# Patient Record
Sex: FEMALE | Race: WHITE | NOT HISPANIC OR LATINO | Employment: FULL TIME | URBAN - NONMETROPOLITAN AREA
[De-identification: names, ages, dates, MRNs, and addresses within clinical notes are randomized per-mention and may not be internally consistent; named-entity substitution may affect disease eponyms.]

---

## 2017-01-04 ENCOUNTER — HOSPITAL ENCOUNTER (OUTPATIENT)
Dept: MAMMOGRAPHY | Facility: HOSPITAL | Age: 67
Discharge: HOME | End: 2017-01-04
Attending: OBSTETRICS & GYNECOLOGY | Admitting: OBSTETRICS & GYNECOLOGY

## 2017-01-04 DIAGNOSIS — Z12.39 ENCOUNTER FOR SCREENING FOR MALIGNANT NEOPLASM OF BREAST: ICD-10-CM

## 2017-01-04 PROCEDURE — 77063 BREAST TOMOSYNTHESIS BI: CPT

## 2017-01-04 PROCEDURE — G0202 SCR MAMMO BI INCL CAD: HCPCS

## 2017-01-04 PROCEDURE — G0123 SCREEN CERV/VAG THIN LAYER: HCPCS | Performed by: OBSTETRICS & GYNECOLOGY

## 2017-01-05 ENCOUNTER — LAB REQUISITION (OUTPATIENT)
Dept: LAB | Facility: HOSPITAL | Age: 67
End: 2017-01-05

## 2017-01-05 DIAGNOSIS — Z12.72 ENCOUNTER FOR SCREENING FOR MALIGNANT NEOPLASM OF VAGINA: ICD-10-CM

## 2017-01-05 LAB
GEN CATEG CVX/VAG CYTO-IMP: NORMAL
LAB AP CASE REPORT: NORMAL
LAB AP GYN ADDITIONAL INFORMATION: NORMAL
Lab: NORMAL
PATH INTERP SPEC-IMP: NORMAL
STAT OF ADQ CVX/VAG CYTO-IMP: NORMAL

## 2017-12-21 ENCOUNTER — TRANSCRIBE ORDERS (OUTPATIENT)
Dept: ADMINISTRATIVE | Facility: HOSPITAL | Age: 67
End: 2017-12-21

## 2017-12-21 DIAGNOSIS — Z12.31 ENCOUNTER FOR SCREENING MAMMOGRAM FOR MALIGNANT NEOPLASM OF BREAST: Primary | ICD-10-CM

## 2018-01-08 ENCOUNTER — HOSPITAL ENCOUNTER (OUTPATIENT)
Dept: MAMMOGRAPHY | Facility: HOSPITAL | Age: 68
Discharge: HOME | End: 2018-01-08
Attending: OBSTETRICS & GYNECOLOGY | Admitting: OBSTETRICS & GYNECOLOGY

## 2018-01-08 DIAGNOSIS — Z12.31 ENCOUNTER FOR SCREENING MAMMOGRAM FOR MALIGNANT NEOPLASM OF BREAST: ICD-10-CM

## 2018-01-08 PROCEDURE — 77063 BREAST TOMOSYNTHESIS BI: CPT

## 2018-01-08 PROCEDURE — G0123 SCREEN CERV/VAG THIN LAYER: HCPCS | Performed by: OBSTETRICS & GYNECOLOGY

## 2018-01-08 PROCEDURE — 77067 SCR MAMMO BI INCL CAD: CPT

## 2018-01-09 ENCOUNTER — LAB REQUISITION (OUTPATIENT)
Dept: LAB | Facility: HOSPITAL | Age: 68
End: 2018-01-09

## 2018-01-09 DIAGNOSIS — Z12.72 ENCOUNTER FOR SCREENING FOR MALIGNANT NEOPLASM OF VAGINA: ICD-10-CM

## 2018-01-09 LAB
GEN CATEG CVX/VAG CYTO-IMP: NORMAL
LAB AP CASE REPORT: NORMAL
LAB AP GYN ADDITIONAL INFORMATION: NORMAL
LAB AP GYN OTHER FINDINGS: NORMAL
Lab: NORMAL
PATH INTERP SPEC-IMP: NORMAL
STAT OF ADQ CVX/VAG CYTO-IMP: NORMAL

## 2019-01-07 ENCOUNTER — TRANSCRIBE ORDERS (OUTPATIENT)
Dept: ADMINISTRATIVE | Facility: HOSPITAL | Age: 69
End: 2019-01-07

## 2019-01-07 DIAGNOSIS — Z12.39 BREAST SCREENING: Primary | ICD-10-CM

## 2019-02-05 ENCOUNTER — HOSPITAL ENCOUNTER (OUTPATIENT)
Dept: MAMMOGRAPHY | Facility: HOSPITAL | Age: 69
Discharge: HOME | End: 2019-02-05
Attending: OBSTETRICS & GYNECOLOGY | Admitting: OBSTETRICS & GYNECOLOGY

## 2019-02-05 DIAGNOSIS — Z12.39 BREAST SCREENING: ICD-10-CM

## 2019-02-05 PROCEDURE — 77067 SCR MAMMO BI INCL CAD: CPT

## 2019-02-05 PROCEDURE — 77063 BREAST TOMOSYNTHESIS BI: CPT

## 2020-02-11 ENCOUNTER — TRANSCRIBE ORDERS (OUTPATIENT)
Dept: ADMINISTRATIVE | Facility: HOSPITAL | Age: 70
End: 2020-02-11

## 2020-02-11 DIAGNOSIS — Z12.31 ENCOUNTER FOR SCREENING MAMMOGRAM FOR MALIGNANT NEOPLASM OF BREAST: Primary | ICD-10-CM

## 2020-02-20 ENCOUNTER — HOSPITAL ENCOUNTER (OUTPATIENT)
Dept: MAMMOGRAPHY | Facility: HOSPITAL | Age: 70
Discharge: HOME | End: 2020-02-20
Admitting: OBSTETRICS & GYNECOLOGY

## 2020-02-20 ENCOUNTER — APPOINTMENT (OUTPATIENT)
Dept: MAMMOGRAPHY | Facility: HOSPITAL | Age: 70
End: 2020-02-20

## 2020-02-20 DIAGNOSIS — Z12.31 ENCOUNTER FOR SCREENING MAMMOGRAM FOR MALIGNANT NEOPLASM OF BREAST: ICD-10-CM

## 2020-02-20 PROCEDURE — 77067 SCR MAMMO BI INCL CAD: CPT

## 2020-02-20 PROCEDURE — 77063 BREAST TOMOSYNTHESIS BI: CPT

## 2021-07-07 ENCOUNTER — OFFICE VISIT (OUTPATIENT)
Dept: OBGYN CLINIC | Age: 71
End: 2021-07-07
Payer: MEDICARE

## 2021-07-07 VITALS
SYSTOLIC BLOOD PRESSURE: 155 MMHG | WEIGHT: 131 LBS | HEIGHT: 67 IN | BODY MASS INDEX: 20.56 KG/M2 | HEART RATE: 69 BPM | DIASTOLIC BLOOD PRESSURE: 79 MMHG

## 2021-07-07 DIAGNOSIS — Z12.31 ENCOUNTER FOR SCREENING MAMMOGRAM FOR BREAST CANCER: ICD-10-CM

## 2021-07-07 DIAGNOSIS — Z79.890 POST-MENOPAUSE ON HRT (HORMONE REPLACEMENT THERAPY): ICD-10-CM

## 2021-07-07 DIAGNOSIS — Z01.419 ENCOUNTER FOR ROUTINE GYNECOLOGIC EXAMINATION IN MEDICARE PATIENT: Primary | ICD-10-CM

## 2021-07-07 DIAGNOSIS — Z76.89 ENCOUNTER TO ESTABLISH CARE: ICD-10-CM

## 2021-07-07 PROCEDURE — G0101 CA SCREEN;PELVIC/BREAST EXAM: HCPCS | Performed by: NURSE PRACTITIONER

## 2021-07-07 RX ORDER — GLUCOSAMINE SULFATE 500 MG
CAPSULE ORAL
COMMUNITY

## 2021-07-07 RX ORDER — ESTRADIOL 2 MG/1
2 TABLET ORAL DAILY
Qty: 90 TABLET | Refills: 3 | Status: SHIPPED | OUTPATIENT
Start: 2021-07-07

## 2021-07-07 RX ORDER — ESTRADIOL 2 MG/1
2 TABLET ORAL DAILY
COMMUNITY
End: 2021-07-07 | Stop reason: SDUPTHER

## 2021-07-07 RX ORDER — CHLORAL HYDRATE 500 MG
1000 CAPSULE ORAL DAILY
COMMUNITY

## 2021-07-07 ASSESSMENT — ENCOUNTER SYMPTOMS
GASTROINTESTINAL NEGATIVE: 1
RESPIRATORY NEGATIVE: 1
EYES NEGATIVE: 1
ALLERGIC/IMMUNOLOGIC NEGATIVE: 1

## 2021-07-07 NOTE — PROGRESS NOTES
[facility]  CNM Office Note    Joo Greco is a 78 y.o. female who presents today for her medical conditions/ complaints as noted below. Chief Complaint   Patient presents with    Gynecologic Exam    Establish Care         HPI  Pt presents to establish care and for annual visit. Has been on estradiol and does well on it and desires continuation. Denies any sexual dysfunction. BP always normal at home but always had white coat syndrome. There is no problem list on file for this patient. No LMP recorded. Patient has had a hysterectomy. [id number]    No past medical history on file. Past Surgical History:   Procedure Laterality Date    HYSTERECTOMY, VAGINAL      and BSO     Family History   Problem Relation Age of Onset    Cancer Mother         ovarian    Cancer Maternal Aunt         breast     Social History     Tobacco Use    Smoking status: Never Smoker    Smokeless tobacco: Never Used   Substance Use Topics    Alcohol use: Never       Current Outpatient Medications   Medication Sig Dispense Refill    Multiple Vitamin (MULTIVITAMIN ADULT PO) Take by mouth      Omega-3 Fatty Acids (FISH OIL) 1000 MG CAPS Take 1,000 mg by mouth daily      estradiol (ESTRACE) 2 MG tablet Take 1 tablet by mouth daily 90 tablet 3    Glucosamine 500 MG CAPS Take by mouth       No current facility-administered medications for this visit. Allergies   Allergen Reactions    Dye [Iodides] Anaphylaxis and Other (See Comments)     Coded after receiving MRI contrast dye     Vitals:    07/07/21 1421   BP: (!) 155/79   Pulse: 69     Body mass index is 20.83 kg/m². Review of Systems   Constitutional: Negative. Negative for fatigue. HENT: Negative. Eyes: Negative. Respiratory: Negative. Cardiovascular: Negative. Gastrointestinal: Negative. Endocrine: Negative. Genitourinary: Negative. Negative for dyspareunia, pelvic pain and vaginal pain. Musculoskeletal: Negative.     Skin: Negative. Allergic/Immunologic: Negative. Neurological: Negative. Hematological: Negative. Psychiatric/Behavioral: Negative. Negative for sleep disturbance. Physical Exam  Constitutional:       Appearance: She is well-developed. HENT:      Head: Normocephalic and atraumatic. Eyes:      Conjunctiva/sclera: Conjunctivae normal.      Pupils: Pupils are equal, round, and reactive to light. Cardiovascular:      Rate and Rhythm: Normal rate and regular rhythm. Heart sounds: Normal heart sounds. Pulmonary:      Effort: Pulmonary effort is normal.   Chest:      Comments: Breasts symmetrical without tenderness, masses, or nipple discharge. Nipples everted bilaterally. Abdominal:      Palpations: Abdomen is soft. Genitourinary:     Vagina: Normal. No vaginal discharge or tenderness. Comments: Uterus: surgically absent  Cervix: surgically absent  Adnexa: surgically absent  Musculoskeletal:      Cervical back: Normal range of motion and neck supple. Skin:     General: Skin is warm and dry. Neurological:      Mental Status: She is alert and oriented to person, place, and time. Diagnosis Orders   1. Encounter for routine gynecologic examination in Medicare patient     2. Encounter for screening mammogram for breast cancer  [facility] DIGITAL SCREEN W OR WO CAD BILATERAL   3. Encounter to establish care     4. Post-menopause on HRT (hormone replacement therapy)         MEDICATIONS:  Orders Placed This Encounter   Medications    estradiol (ESTRACE) 2 MG tablet     Sig: Take 1 tablet by mouth daily     Dispense:  90 tablet     Refill:  3       ORDERS:  Orders Placed This Encounter   Procedures    [facility] DIGITAL SCREEN W OR WO CAD BILATERAL       PLAN:  1. [facility]- Pap not indicated s/p hyst, SBE reviewed and CBE performed. Annual bld work with PCP. Mammogram ordered for later date  2. Post Menopausal HRT continued at current dosage/route.  Discussed recommendations to use at lowest effective dose for shortest duration with pt understanding.

## 2021-07-08 ENCOUNTER — TRANSCRIBE ORDERS (OUTPATIENT)
Dept: ADMINISTRATIVE | Facility: HOSPITAL | Age: 71
End: 2021-07-08

## 2021-07-08 DIAGNOSIS — Z12.31 ENCOUNTER FOR SCREENING MAMMOGRAM FOR BREAST CANCER: Primary | ICD-10-CM

## 2021-07-21 ENCOUNTER — HOSPITAL ENCOUNTER (OUTPATIENT)
Dept: MAMMOGRAPHY | Facility: HOSPITAL | Age: 71
Discharge: HOME | End: 2021-07-21
Admitting: NURSE PRACTITIONER

## 2021-07-21 DIAGNOSIS — Z12.31 ENCOUNTER FOR SCREENING MAMMOGRAM FOR BREAST CANCER: ICD-10-CM

## 2021-07-21 PROCEDURE — 77067 SCR MAMMO BI INCL CAD: CPT

## 2021-07-21 PROCEDURE — 77063 BREAST TOMOSYNTHESIS BI: CPT

## 2021-10-02 ENCOUNTER — APPOINTMENT (OUTPATIENT)
Dept: CT IMAGING | Facility: HOSPITAL | Age: 71
End: 2021-10-02

## 2021-10-02 ENCOUNTER — APPOINTMENT (OUTPATIENT)
Dept: GENERAL RADIOLOGY | Facility: HOSPITAL | Age: 71
End: 2021-10-02

## 2021-10-02 ENCOUNTER — HOSPITAL ENCOUNTER (INPATIENT)
Facility: HOSPITAL | Age: 71
LOS: 2 days | Discharge: HOME | End: 2021-10-04
Attending: EMERGENCY MEDICINE | Admitting: PSYCHIATRY & NEUROLOGY

## 2021-10-02 DIAGNOSIS — Z74.09 IMPAIRED MOBILITY AND ADLS: ICD-10-CM

## 2021-10-02 DIAGNOSIS — I63.512 ACUTE ISCHEMIC LEFT MCA STROKE (HCC): ICD-10-CM

## 2021-10-02 DIAGNOSIS — Z78.9 IMPAIRED MOBILITY AND ADLS: ICD-10-CM

## 2021-10-02 DIAGNOSIS — R13.10 DYSPHAGIA, UNSPECIFIED TYPE: ICD-10-CM

## 2021-10-02 DIAGNOSIS — I63.9 CEREBROVASCULAR ACCIDENT (CVA), UNSPECIFIED MECHANISM (HCC): Primary | ICD-10-CM

## 2021-10-02 DIAGNOSIS — Z74.09 IMPAIRED FUNCTIONAL MOBILITY AND ACTIVITY TOLERANCE: ICD-10-CM

## 2021-10-02 LAB
ABO GROUP BLD: NORMAL
ALBUMIN SERPL-MCNC: 4.3 G/DL (ref 3.5–5.2)
ALBUMIN SERPL-MCNC: 4.6 G/DL (ref 3.5–5.2)
ALBUMIN/GLOB SERPL: 1.4 G/DL
ALBUMIN/GLOB SERPL: 1.5 G/DL
ALP SERPL-CCNC: 83 U/L (ref 39–117)
ALP SERPL-CCNC: 89 U/L (ref 39–117)
ALT SERPL W P-5'-P-CCNC: 10 U/L (ref 1–33)
ALT SERPL W P-5'-P-CCNC: 11 U/L (ref 1–33)
ANION GAP SERPL CALCULATED.3IONS-SCNC: 16 MMOL/L (ref 5–15)
ANION GAP SERPL CALCULATED.3IONS-SCNC: 17 MMOL/L (ref 5–15)
APTT PPP: 26.4 SECONDS (ref 24.1–35)
AST SERPL-CCNC: 18 U/L (ref 1–32)
AST SERPL-CCNC: 19 U/L (ref 1–32)
BASOPHILS # BLD AUTO: 0.03 10*3/MM3 (ref 0–0.2)
BASOPHILS NFR BLD AUTO: 0.4 % (ref 0–1.5)
BILIRUB SERPL-MCNC: 0.5 MG/DL (ref 0–1.2)
BILIRUB SERPL-MCNC: 0.7 MG/DL (ref 0–1.2)
BLD GP AB SCN SERPL QL: NEGATIVE
BUN SERPL-MCNC: 15 MG/DL (ref 8–23)
BUN SERPL-MCNC: 16 MG/DL (ref 8–23)
BUN/CREAT SERPL: 22.1 (ref 7–25)
BUN/CREAT SERPL: 22.2 (ref 7–25)
CALCIUM SPEC-SCNC: 9.3 MG/DL (ref 8.6–10.5)
CALCIUM SPEC-SCNC: 9.4 MG/DL (ref 8.6–10.5)
CHLORIDE SERPL-SCNC: 102 MMOL/L (ref 98–107)
CHLORIDE SERPL-SCNC: 99 MMOL/L (ref 98–107)
CHOLEST SERPL-MCNC: 252 MG/DL (ref 0–200)
CO2 SERPL-SCNC: 21 MMOL/L (ref 22–29)
CO2 SERPL-SCNC: 22 MMOL/L (ref 22–29)
CREAT SERPL-MCNC: 0.68 MG/DL (ref 0.57–1)
CREAT SERPL-MCNC: 0.72 MG/DL (ref 0.57–1)
DEPRECATED RDW RBC AUTO: 44 FL (ref 37–54)
DEPRECATED RDW RBC AUTO: 44.6 FL (ref 37–54)
EOSINOPHIL # BLD AUTO: 0.13 10*3/MM3 (ref 0–0.4)
EOSINOPHIL NFR BLD AUTO: 1.9 % (ref 0.3–6.2)
ERYTHROCYTE [DISTWIDTH] IN BLOOD BY AUTOMATED COUNT: 12.5 % (ref 12.3–15.4)
ERYTHROCYTE [DISTWIDTH] IN BLOOD BY AUTOMATED COUNT: 12.7 % (ref 12.3–15.4)
GFR SERPL CREATININE-BSD FRML MDRD: 81 ML/MIN/1.73
GFR SERPL CREATININE-BSD FRML MDRD: 86 ML/MIN/1.73
GLOBULIN UR ELPH-MCNC: 3 GM/DL
GLOBULIN UR ELPH-MCNC: 3.1 GM/DL
GLUCOSE BLDC GLUCOMTR-MCNC: 61 MG/DL (ref 70–130)
GLUCOSE BLDC GLUCOMTR-MCNC: 71 MG/DL (ref 70–130)
GLUCOSE SERPL-MCNC: 83 MG/DL (ref 65–99)
GLUCOSE SERPL-MCNC: 97 MG/DL (ref 65–99)
HBA1C MFR BLD: 5.3 % (ref 4.8–5.6)
HCT VFR BLD AUTO: 37.4 % (ref 34–46.6)
HCT VFR BLD AUTO: 41.8 % (ref 34–46.6)
HDLC SERPL-MCNC: 94 MG/DL (ref 40–60)
HGB BLD-MCNC: 12.8 G/DL (ref 12–15.9)
HGB BLD-MCNC: 14.1 G/DL (ref 12–15.9)
HOLD SPECIMEN: NORMAL
HOLD SPECIMEN: NORMAL
IMM GRANULOCYTES # BLD AUTO: 0.02 10*3/MM3 (ref 0–0.05)
IMM GRANULOCYTES NFR BLD AUTO: 0.3 % (ref 0–0.5)
INR PPP: 0.88 (ref 0.91–1.09)
LDLC SERPL CALC-MCNC: 135 MG/DL (ref 0–100)
LDLC/HDLC SERPL: 1.39 {RATIO}
LYMPHOCYTES # BLD AUTO: 2.94 10*3/MM3 (ref 0.7–3.1)
LYMPHOCYTES NFR BLD AUTO: 42.3 % (ref 19.6–45.3)
MCH RBC QN AUTO: 32.5 PG (ref 26.6–33)
MCH RBC QN AUTO: 32.8 PG (ref 26.6–33)
MCHC RBC AUTO-ENTMCNC: 33.7 G/DL (ref 31.5–35.7)
MCHC RBC AUTO-ENTMCNC: 34.2 G/DL (ref 31.5–35.7)
MCV RBC AUTO: 95.9 FL (ref 79–97)
MCV RBC AUTO: 96.3 FL (ref 79–97)
MONOCYTES # BLD AUTO: 0.38 10*3/MM3 (ref 0.1–0.9)
MONOCYTES NFR BLD AUTO: 5.5 % (ref 5–12)
NEUTROPHILS NFR BLD AUTO: 3.45 10*3/MM3 (ref 1.7–7)
NEUTROPHILS NFR BLD AUTO: 49.6 % (ref 42.7–76)
NRBC BLD AUTO-RTO: 0 /100 WBC (ref 0–0.2)
PLATELET # BLD AUTO: 311 10*3/MM3 (ref 140–450)
PLATELET # BLD AUTO: 343 10*3/MM3 (ref 140–450)
PMV BLD AUTO: 10.1 FL (ref 6–12)
PMV BLD AUTO: 10.4 FL (ref 6–12)
POTASSIUM SERPL-SCNC: 3.5 MMOL/L (ref 3.5–5.2)
POTASSIUM SERPL-SCNC: 3.7 MMOL/L (ref 3.5–5.2)
PROT SERPL-MCNC: 7.4 G/DL (ref 6–8.5)
PROT SERPL-MCNC: 7.6 G/DL (ref 6–8.5)
PROTHROMBIN TIME: 11.6 SECONDS (ref 11.9–14.6)
RBC # BLD AUTO: 3.9 10*6/MM3 (ref 3.77–5.28)
RBC # BLD AUTO: 4.34 10*6/MM3 (ref 3.77–5.28)
RH BLD: POSITIVE
SARS-COV-2 RNA PNL SPEC NAA+PROBE: NOT DETECTED
SODIUM SERPL-SCNC: 138 MMOL/L (ref 136–145)
SODIUM SERPL-SCNC: 139 MMOL/L (ref 136–145)
T&S EXPIRATION DATE: NORMAL
TRIGL SERPL-MCNC: 136 MG/DL (ref 0–150)
TROPONIN T SERPL-MCNC: <0.01 NG/ML (ref 0–0.03)
TSH SERPL DL<=0.05 MIU/L-ACNC: 3.91 UIU/ML (ref 0.27–4.2)
VLDLC SERPL-MCNC: 23 MG/DL (ref 5–40)
WBC # BLD AUTO: 6.95 10*3/MM3 (ref 3.4–10.8)
WBC # BLD AUTO: 9.33 10*3/MM3 (ref 3.4–10.8)
WHOLE BLOOD HOLD SPECIMEN: NORMAL
WHOLE BLOOD HOLD SPECIMEN: NORMAL

## 2021-10-02 PROCEDURE — 82607 VITAMIN B-12: CPT | Performed by: PSYCHIATRY & NEUROLOGY

## 2021-10-02 PROCEDURE — 86850 RBC ANTIBODY SCREEN: CPT | Performed by: EMERGENCY MEDICINE

## 2021-10-02 PROCEDURE — 3E03317 INTRODUCTION OF OTHER THROMBOLYTIC INTO PERIPHERAL VEIN, PERCUTANEOUS APPROACH: ICD-10-PCS | Performed by: PSYCHIATRY & NEUROLOGY

## 2021-10-02 PROCEDURE — 80053 COMPREHEN METABOLIC PANEL: CPT | Performed by: EMERGENCY MEDICINE

## 2021-10-02 PROCEDURE — 99291 CRITICAL CARE FIRST HOUR: CPT

## 2021-10-02 PROCEDURE — 86900 BLOOD TYPING SEROLOGIC ABO: CPT | Performed by: EMERGENCY MEDICINE

## 2021-10-02 PROCEDURE — 25010000002 ALTEPLASE PER 1 MG: Performed by: EMERGENCY MEDICINE

## 2021-10-02 PROCEDURE — 85025 COMPLETE CBC W/AUTO DIFF WBC: CPT | Performed by: EMERGENCY MEDICINE

## 2021-10-02 PROCEDURE — 82962 GLUCOSE BLOOD TEST: CPT

## 2021-10-02 PROCEDURE — 0042T HC CT CEREBRAL PERFUSION W/WO CONTRAST: CPT

## 2021-10-02 PROCEDURE — 85730 THROMBOPLASTIN TIME PARTIAL: CPT | Performed by: EMERGENCY MEDICINE

## 2021-10-02 PROCEDURE — 85610 PROTHROMBIN TIME: CPT | Performed by: EMERGENCY MEDICINE

## 2021-10-02 PROCEDURE — 70450 CT HEAD/BRAIN W/O DYE: CPT

## 2021-10-02 PROCEDURE — 0 IOPAMIDOL PER 1 ML: Performed by: EMERGENCY MEDICINE

## 2021-10-02 PROCEDURE — 70496 CT ANGIOGRAPHY HEAD: CPT

## 2021-10-02 PROCEDURE — 84484 ASSAY OF TROPONIN QUANT: CPT | Performed by: EMERGENCY MEDICINE

## 2021-10-02 PROCEDURE — 87635 SARS-COV-2 COVID-19 AMP PRB: CPT | Performed by: EMERGENCY MEDICINE

## 2021-10-02 PROCEDURE — 71045 X-RAY EXAM CHEST 1 VIEW: CPT

## 2021-10-02 PROCEDURE — 99223 1ST HOSP IP/OBS HIGH 75: CPT | Performed by: PSYCHIATRY & NEUROLOGY

## 2021-10-02 PROCEDURE — 85027 COMPLETE CBC AUTOMATED: CPT | Performed by: PSYCHIATRY & NEUROLOGY

## 2021-10-02 PROCEDURE — 93010 ELECTROCARDIOGRAM REPORT: CPT | Performed by: INTERNAL MEDICINE

## 2021-10-02 PROCEDURE — 86901 BLOOD TYPING SEROLOGIC RH(D): CPT | Performed by: EMERGENCY MEDICINE

## 2021-10-02 PROCEDURE — 80053 COMPREHEN METABOLIC PANEL: CPT | Performed by: PSYCHIATRY & NEUROLOGY

## 2021-10-02 PROCEDURE — 80061 LIPID PANEL: CPT | Performed by: PSYCHIATRY & NEUROLOGY

## 2021-10-02 PROCEDURE — 93005 ELECTROCARDIOGRAM TRACING: CPT | Performed by: EMERGENCY MEDICINE

## 2021-10-02 PROCEDURE — 84443 ASSAY THYROID STIM HORMONE: CPT | Performed by: PSYCHIATRY & NEUROLOGY

## 2021-10-02 PROCEDURE — 70498 CT ANGIOGRAPHY NECK: CPT

## 2021-10-02 PROCEDURE — 83036 HEMOGLOBIN GLYCOSYLATED A1C: CPT | Performed by: PSYCHIATRY & NEUROLOGY

## 2021-10-02 RX ORDER — ONDANSETRON 2 MG/ML
4 INJECTION INTRAMUSCULAR; INTRAVENOUS EVERY 6 HOURS PRN
Status: DISCONTINUED | OUTPATIENT
Start: 2021-10-02 | End: 2021-10-04 | Stop reason: HOSPADM

## 2021-10-02 RX ORDER — ASPIRIN 300 MG/1
300 SUPPOSITORY RECTAL DAILY
Status: DISCONTINUED | OUTPATIENT
Start: 2021-10-03 | End: 2021-10-04 | Stop reason: HOSPADM

## 2021-10-02 RX ORDER — SODIUM CHLORIDE 0.9 % (FLUSH) 0.9 %
10 SYRINGE (ML) INJECTION EVERY 12 HOURS SCHEDULED
Status: DISCONTINUED | OUTPATIENT
Start: 2021-10-02 | End: 2021-10-04 | Stop reason: HOSPADM

## 2021-10-02 RX ORDER — BISACODYL 10 MG
10 SUPPOSITORY, RECTAL RECTAL DAILY PRN
Status: DISCONTINUED | OUTPATIENT
Start: 2021-10-02 | End: 2021-10-04 | Stop reason: HOSPADM

## 2021-10-02 RX ORDER — SODIUM CHLORIDE 0.9 % (FLUSH) 0.9 %
10 SYRINGE (ML) INJECTION AS NEEDED
Status: DISCONTINUED | OUTPATIENT
Start: 2021-10-02 | End: 2021-10-04 | Stop reason: HOSPADM

## 2021-10-02 RX ORDER — DEXTROSE MONOHYDRATE 25 G/50ML
25 INJECTION, SOLUTION INTRAVENOUS ONCE
Status: DISCONTINUED | OUTPATIENT
Start: 2021-10-02 | End: 2021-10-02

## 2021-10-02 RX ORDER — SODIUM CHLORIDE 9 MG/ML
75 INJECTION, SOLUTION INTRAVENOUS CONTINUOUS
Status: DISCONTINUED | OUTPATIENT
Start: 2021-10-02 | End: 2021-10-04 | Stop reason: HOSPADM

## 2021-10-02 RX ORDER — ATORVASTATIN CALCIUM 40 MG/1
80 TABLET, FILM COATED ORAL NIGHTLY
Status: DISCONTINUED | OUTPATIENT
Start: 2021-10-02 | End: 2021-10-04 | Stop reason: HOSPADM

## 2021-10-02 RX ORDER — ASPIRIN 325 MG
325 TABLET ORAL DAILY
Status: DISCONTINUED | OUTPATIENT
Start: 2021-10-03 | End: 2021-10-04 | Stop reason: HOSPADM

## 2021-10-02 RX ORDER — MULTIPLE VITAMINS W/ MINERALS TAB 9MG-400MCG
1 TAB ORAL DAILY
COMMUNITY

## 2021-10-02 RX ORDER — SODIUM PHOSPHATE,MONO-DIBASIC 19G-7G/118
1 ENEMA (ML) RECTAL
COMMUNITY

## 2021-10-02 RX ORDER — SODIUM CHLORIDE 9 MG/ML
100 INJECTION, SOLUTION INTRAVENOUS ONCE
Status: COMPLETED | OUTPATIENT
Start: 2021-10-02 | End: 2021-10-02

## 2021-10-02 RX ORDER — CHLORAL HYDRATE 500 MG
CAPSULE ORAL
COMMUNITY
End: 2022-09-20

## 2021-10-02 RX ORDER — LABETALOL HYDROCHLORIDE 5 MG/ML
10 INJECTION, SOLUTION INTRAVENOUS
Status: DISCONTINUED | OUTPATIENT
Start: 2021-10-02 | End: 2021-10-04 | Stop reason: HOSPADM

## 2021-10-02 RX ADMIN — IOPAMIDOL 80 ML: 755 INJECTION, SOLUTION INTRAVENOUS at 17:13

## 2021-10-02 RX ADMIN — ALTEPLASE 49.2 MG: KIT at 18:12

## 2021-10-02 RX ADMIN — SODIUM CHLORIDE 100 ML: 9 INJECTION, SOLUTION INTRAVENOUS at 19:12

## 2021-10-02 RX ADMIN — SODIUM CHLORIDE 75 ML/HR: 9 INJECTION, SOLUTION INTRAVENOUS at 20:24

## 2021-10-02 RX ADMIN — ATORVASTATIN CALCIUM 80 MG: 40 TABLET, FILM COATED ORAL at 21:32

## 2021-10-02 NOTE — H&P
Neurology History & Physical    Jesika Bowers  1950  [id number]      10/2/2021    Indication for Admission: Acute stroke    History of present illness:    This is a 70 y.o. right handed female who is admitted for acute aphasia and stroke    Patient last known well was 3 PM but  found her at 4:15 PM and she was not able to speak and had difficulty walking  She does not smoke, or have DM, Hypertension or hyperlipidemia but she is on estrogen    Head CT was unremarkable  CTA of head and neck  Perfusion scan showed area of risk  No past medical history on file.  Past Surgical History:   Procedure Laterality Date   • HYSTERECTOMY     • OOPHORECTOMY         Prior to Admission medications    Medication Sig Start Date End Date Taking? Authorizing Provider   estrogens, conjugated, (PREMARIN) 0.3 MG tablet Take 0.3 mg by mouth Daily. Take daily for 21 days then do not take for 7 days.   Yes Rene Ramires MD   glucosamine-chondroitin 500-400 MG capsule capsule Take 1 capsule by mouth 3 (Three) Times a Day With Meals.   Yes Rene Ramires MD   multivitamin with minerals tablet tablet Take 1 tablet by mouth Daily.   Yes Rene Ramires MD   Omega-3 Fatty Acids (fish oil) 1000 MG capsule capsule Take  by mouth Daily With Breakfast.   Yes Rene Ramires MD       Hospital scheduled medications:      Hospital PRN medications:  •  sodium chloride    Allergies   Allergen Reactions   • Gadolinium Derivatives Anaphylaxis     Cardiac arrest per .     (Not in a hospital admission)      Social History     Socioeconomic History   • Marital status:      Spouse name: Not on file   • Number of children: Not on file   • Years of education: Not on file   • Highest education level: Not on file     Family History   Problem Relation Age of Onset   • Breast cancer Maternal Aunt    • No Known Problems Mother    • No Known Problems Father    • No Known Problems Sister    • No Known Problems  Brother    • No Known Problems Daughter    • No Known Problems Son    • No Known Problems Maternal Grandmother    • No Known Problems Paternal Grandmother    • No Known Problems Paternal Aunt    • No Known Problems Other    • BRCA 1/2 Neg Hx    • Colon cancer Neg Hx    • Endometrial cancer Neg Hx    • Ovarian cancer Neg Hx        Review of Systems  A 14 point review of systems was unobtainable due to her aphasia  Vital Signs   Temp:  [98 °F (36.7 °C)] 98 °F (36.7 °C)  Heart Rate:  [103-105] 105  Resp:  [20] 20  BP: (167-182)/(82-86) 182/86    General Exam:  Head:  Normal cephalic, atraumatic  HEENT:  Neck supple  Fundoscopic Exam:  No signs of disc edema  CVS:  Regular rate and rhythm.  No murmurs  Carotid Examination:  No bruits  Lungs:  Clear to auscultation  Abdomen:  Non-tender, Non-distended  Extremities:  No signs of peripheral edema  Skin:  No rashes    Neurologic Exam:    Mental Status:    -Awake, Alert, Very anxious Repeating syllables as she is trying to speak  Will follow some commands partially but not fully comprehending what to do. She is having  difficulty getting words out and will repeat the words given to her sometimes  Unable to name items, read sentences and does not repeat when asked to repeat words. Could not describe picture  Unable-Follows simple and complex commands    CN II:  Visual fields full.  Pupils equally reactive to light  CN III, IV, VI:  Extraocular Muscles full with no signs of nystagmus  CN V:  Facial sensory is symmetric with no asymmetries.  CN VII:  Facial motor symmetric  CN VIII:  Gross hearing intact bilaterally  CN IX/X:  Palate elevates symmetrically  CN XI:  Shoulder shrug symmetric  CN XII:  Tongue is midline on protrusion    Motor: (strength out of 5:  1= minimal movement, 2 = movement in plane of gravity, 3 = movement against gravity, 4 = movement against some resistance, 5 = full strength)  She moves all 4 extremities well     DTR:  -Right   Bicep: 2+ Triceps:  2+ Brachioradialis: 2+   Patella: 2+ Ankle: 2+ Neg Babinski  -Left   Bicep: 2+ Triceps: 2+ Brachioradialis: 2+   Patella: 2+ Ankle: 2+ Neg Babinski    Sensory:  -Unable to assess  light touch, pinprick, temperature, pain, and proprioception due to aphasia    Coordination:  -Finger to nose/Heel to shin  --she could not comprehend command-No ataxia    Gait  -Not tested due to code stroke      Results Review:  Lab Results (last 7 days)     Procedure Component Value Units Date/Time    COVID PRE-OP / PRE-PROCEDURE SCREENING ORDER (NO ISOLATION) - Swab, Nasal Cavity [[id number]] Collected: 10/02/21 1741    Specimen: Swab from Nasal Cavity Updated: 10/02/21 1749    Narrative:      The following orders were created for panel order COVID PRE-OP / PRE-PROCEDURE SCREENING ORDER (NO ISOLATION) - Swab, Nasal Cavity.  Procedure                               Abnormality         Status                     ---------                               -----------         ------                     COVID-19,[facility] IN-LIN...[[id number]]                      In process                   Please view results for these tests on the individual orders.    COVID-19,[facility] IN-HOUSE,Nasal Swab No Transport Media 3-4 HR TAT - Swab, Nasal Cavity [[id number]] Collected: 10/02/21 1741    Specimen: Swab from Nasal Cavity Updated: 10/02/21 1749    Comprehensive Metabolic Panel [[id number]]  (Abnormal) Collected: 10/02/21 1701    Specimen: Blood Updated: 10/02/21 1738     Glucose 97 mg/dL      BUN 16 mg/dL      Creatinine 0.72 mg/dL      Sodium 138 mmol/L      Potassium 3.5 mmol/L      Chloride 99 mmol/L      CO2 22.0 mmol/L      Calcium 9.4 mg/dL      Total Protein 7.6 g/dL      Albumin 4.60 g/dL      ALT (SGPT) 10 U/L      AST (SGOT) 18 U/L      Alkaline Phosphatase 89 U/L      Total Bilirubin 0.5 mg/dL      eGFR Non African Amer 81 mL/min/1.73      Globulin 3.0 gm/dL      A/G Ratio 1.5 g/dL      BUN/Creatinine Ratio 22.2     Anion Gap 17.0 mmol/L      Narrative:      GFR Normal >60  Chronic Kidney Disease <60  Kidney Failure <15      Troponin [[id number]]  (Normal) Collected: 10/02/21 1701    Specimen: Blood Updated: 10/02/21 1735     Troponin T <0.010 ng/mL     Narrative:      Troponin T Reference Range:  <= 0.03 ng/mL-   Negative for AMI  >0.03 ng/mL-     Abnormal for myocardial necrosis.  Clinicians would have to utilize clinical acumen, EKG, Troponin and serial changes to determine if it is an Acute Myocardial Infarction or myocardial injury due to an underlying chronic condition.       Results may be falsely decreased if patient taking Biotin.      Protime-INR [[id number]]  (Abnormal) Collected: 10/02/21 1701    Specimen: Blood Updated: 10/02/21 1729     Protime 11.6 Seconds      INR 0.88    aPTT [[id number]]  (Normal) Collected: 10/02/21 1701    Specimen: Blood Updated: 10/02/21 1729     PTT 26.4 seconds     CBC & Differential [[id number]]  (Normal) Collected: 10/02/21 1701    Specimen: Blood Updated: 10/02/21 1719    Narrative:      The following orders were created for panel order CBC & Differential.  Procedure                               Abnormality         Status                     ---------                               -----------         ------                     CBC Auto Differential[[id number]]        Normal              Final result                 Please view results for these tests on the individual orders.    CBC Auto Differential [[id number]]  (Normal) Collected: 10/02/21 1701    Specimen: Blood Updated: 10/02/21 1719     WBC 6.95 10*3/mm3      RBC 3.90 10*6/mm3      Hemoglobin 12.8 g/dL      Hematocrit 37.4 %      MCV 95.9 fL      MCH 32.8 pg      MCHC 34.2 g/dL      RDW 12.7 %      RDW-SD 44.0 fl      MPV 10.4 fL      Platelets 343 10*3/mm3      Neutrophil % 49.6 %      Lymphocyte % 42.3 %      Monocyte % 5.5 %      Eosinophil % 1.9 %      Basophil % 0.4 %      Immature Grans % 0.3 %      Neutrophils, Absolute 3.45 10*3/mm3      Lymphocytes,  Absolute 2.94 10*3/mm3      Monocytes, Absolute 0.38 10*3/mm3      Eosinophils, Absolute 0.13 10*3/mm3      Basophils, Absolute 0.03 10*3/mm3      Immature Grans, Absolute 0.02 10*3/mm3      nRBC 0.0 /100 WBC     Red Top [[id number]] Collected: 10/02/21 1701    Specimen: Blood Updated: 10/02/21 1715    Light Blue Top [[id number]] Collected: 10/02/21 1701    Specimen: Blood Updated: 10/02/21 1715    Green Top (Gel) [[id number]] Collected: 10/02/21 1701    Specimen: Blood Updated: 10/02/21 1715    [place] Draw [[id number]] Collected: 10/02/21 1701    Specimen: Blood Updated: 10/02/21 1715    Narrative:      The following orders were created for panel order [place] Draw.  Procedure                               Abnormality         Status                     ---------                               -----------         ------                     Green Top (Gel)[[id number]]                                  In process                 Lavender Top[[id number]]                                     In process                 Red Top[[id number]]                                          In process                 Light Blue Top[[id number]]                                   In process                   Please view results for these tests on the individual orders.    Lavender Top [[id number]] Collected: 10/02/21 1701    Specimen: Blood Updated: 10/02/21 1715    POC Glucose Once [[id number]]  (Abnormal) Collected: 10/02/21 1700    Specimen: Blood Updated: 10/02/21 1712     Glucose 61 mg/dL      Comment: : [id number] ID: [id number]             Head CT personally reviewed and was unremarkable for acute stroke  CTA:  CT perfusion shows the area of risk as left MCA with out area of definite infarct.:    There is no problem list on file for this patient.          Impression:    1.Acute left MCA ischemia presenting with aphasia  2. Patient on estrogen and this is her only known risk factor for stroke  3. TPA     There was  a delay in TPA due to delay in notification of stroke team  Plan:    TPA STAT  Admit to ICU  Cardiac monitoring  Echo with bubble  D/C estrogen  Lipid profile  Hemoglobin A1C  Speech therapy  PT/OT  Lipitor    ADDENDUM: Patient began speaking in a full sentence shortly after bolus of TPA.  Still some speech hesitancy but markedly better.     Madelyn Virk MD  10/02/21  17:58 CDT

## 2021-10-02 NOTE — ED NOTES
17:03 pm Code stroke called...17:25 pm called Dr.Braun Virk for  due her not calling back on the code...17:28 p.m. Dr.Braun Virk returned the call     Rivka Flood  10/02/21 1728       Rivka Flood  10/02/21 1739

## 2021-10-02 NOTE — ED PROVIDER NOTES
"Subjective   70-year-old female presents to the emergency department with concern for stroke.  She was last known well at 3 PM, approximately 2 hours prior to arrival.  Patient was found abnormal at 4:15 PM.  The patient states he was outside when he heard his wife call his name she had significant expressive aphasia and seemed extremely anxious.  She was unable to speak clearly, had a stuttering speech when she attempted to speak, and would repeat some phrases.  Patient with difficulty standing as well, he states that she was attempting to come down the stairs and had to slide on her bottom.  On arrival to the ER, patient just repeating the word \"no\" to almost every question.  She can not consistently follow simple commands.    Family gave the patient 325 mg aspirin around 4:15 PM.  Not able to obtain any additional information.      History provided by:  Spouse  History limited by:  Patient nonverbal      Review of Systems   Unable to perform ROS: Patient nonverbal       History reviewed. No pertinent past medical history.    Allergies   Allergen Reactions   • Gadolinium Derivatives Anaphylaxis     Cardiac arrest per .       Past Surgical History:   Procedure Laterality Date   • HYSTERECTOMY     • OOPHORECTOMY         Family History   Problem Relation Age of Onset   • Breast cancer Maternal Aunt    • No Known Problems Mother    • No Known Problems Father    • No Known Problems Sister    • No Known Problems Brother    • No Known Problems Daughter    • No Known Problems Son    • No Known Problems Maternal Grandmother    • No Known Problems Paternal Grandmother    • No Known Problems Paternal Aunt    • No Known Problems Other    • BRCA 1/2 Neg Hx    • Colon cancer Neg Hx    • Endometrial cancer Neg Hx    • Ovarian cancer Neg Hx        Social History     Socioeconomic History   • Marital status:      Spouse name: Not on file   • Number of children: Not on file   • Years of education: Not on file   • " Highest education level: Not on file   Tobacco Use   • Smoking status: Never Smoker   Substance and Sexual Activity   • Alcohol use: Never   • Drug use: Never           Objective   Physical Exam  Vitals and nursing note reviewed.   Constitutional:       Appearance: She is ill-appearing.      Comments: Well-developed well-nourished elderly female who appears to be extremely scared, obvious expressive aphasia   HENT:      Head: Normocephalic and atraumatic.      Nose: Nose normal. No congestion or rhinorrhea.      Mouth/Throat:      Mouth: Mucous membranes are moist.      Pharynx: Oropharynx is clear. No oropharyngeal exudate or posterior oropharyngeal erythema.   Eyes:      General:         Right eye: No discharge.         Left eye: No discharge.      Extraocular Movements: Extraocular movements intact.      Pupils: Pupils are equal, round, and reactive to light.   Cardiovascular:      Rate and Rhythm: Normal rate and regular rhythm.      Pulses: Normal pulses.      Heart sounds: Normal heart sounds. No murmur heard.   No friction rub.   Pulmonary:      Effort: Pulmonary effort is normal.      Breath sounds: Normal breath sounds. No wheezing, rhonchi or rales.   Abdominal:      General: Abdomen is flat. Bowel sounds are normal. There is no distension.      Palpations: Abdomen is soft.      Tenderness: There is no abdominal tenderness.   Musculoskeletal:         General: No swelling or tenderness. Normal range of motion.      Cervical back: Normal range of motion and neck supple. No tenderness.   Skin:     General: Skin is warm and dry.      Capillary Refill: Capillary refill takes less than 2 seconds.   Neurological:      Mental Status: She is alert. She is disoriented.      Cranial Nerves: No cranial nerve deficit.      Sensory: Sensory deficit present.      Motor: No weakness.      Comments: Difficult to assess due to both expressive and receptive aphasia  No obvious focal weakness, questionable decrease sensation  right upper extremity.  She is moving all extremities appears to have symmetric  strength  Patient is alert but appears disoriented  No gross focal cranial nerve deficits  Unable to follow commands to assess finger-to-nose and heel-to-shin     Psychiatric:      Comments: Anxious mood         Critical Care  Performed by: Mike Dorman MD  Authorized by: Madelyn Dove MD     Critical care provider statement:     Critical care time (minutes):  45    Critical care time was exclusive of:  Separately billable procedures and treating other patients and teaching time    Critical care was necessary to treat or prevent imminent or life-threatening deterioration of the following conditions:  CNS failure or compromise (Ischemic stroke requiring TPA)    Critical care was time spent personally by me on the following activities:  Ordering and performing treatments and interventions, re-evaluation of patient's condition, ordering and review of laboratory studies, ordering and review of radiographic studies, pulse oximetry, obtaining history from patient or surrogate, examination of patient, evaluation of patient's response to treatment and discussions with consultants               ED Course  ED Course as of Oct 02 2244   Sat Oct 02, 2021   1716 70-year-old healthy female presents to the emergency department with a sudden onset of speech abnormality and unsteady gait.  Last known well was 3 PM, was found to be abnormal by  around 4:15 PM when he noted her being unable to speak and having difficulty walking.  Family administered at 325 mg of aspirin prior to arrival.    Arrival to the emergency department patient was visibly scared and anxious.  She had expressive aphasia and did have some difficulty following commands.   NIH stroke scale on arrival was 6  Code stroke was initiated and patient was taken emergently to CT suite for stroke alert imaging.    She is within 3 hr window for TPA.    [AW]   8753  Spoke to Dr Vieyra, she is en route to ED.   Will discuss risks and benefits of TPA with  and review for any possible contraindications, go ahead and have nurses set up for TPA administration    [AW]   1840 Patient received TPA in the emergency department, will admit to the ICU to the service of Dr. Selwyn Virk    [AW]      ED Course User Index  [AW] Mike Dorman MD                                           MDM  Number of Diagnoses or Management Options  Cerebrovascular accident (CVA), unspecified mechanism (HCC): new and requires workup     Amount and/or Complexity of Data Reviewed  Clinical lab tests: reviewed  Tests in the radiology section of CPT®: reviewed    Risk of Complications, Morbidity, and/or Mortality  Presenting problems: high  Diagnostic procedures: high  Management options: high        Final diagnoses:   Cerebrovascular accident (CVA), unspecified mechanism (HCC)       ED Disposition  ED Disposition     ED Disposition Condition Comment    Decision to Admit  Level of Care: Critical Care [6]   Diagnosis: Stroke aborted by administration of thrombolytic agent (HCC) [[id number]]   Isolate for COVID?: No [0]   Certification: I Certify That Inpatient Hospital Services Are Medically Necessary For Greater Than 2 Midnights            No follow-up provider specified.       Medication List      No changes were made to your prescriptions during this visit.          Mike Dorman MD  10/02/21 4618

## 2021-10-03 ENCOUNTER — APPOINTMENT (OUTPATIENT)
Dept: CARDIOLOGY | Facility: HOSPITAL | Age: 71
End: 2021-10-03

## 2021-10-03 ENCOUNTER — APPOINTMENT (OUTPATIENT)
Dept: CT IMAGING | Facility: HOSPITAL | Age: 71
End: 2021-10-03

## 2021-10-03 ENCOUNTER — APPOINTMENT (OUTPATIENT)
Dept: MRI IMAGING | Facility: HOSPITAL | Age: 71
End: 2021-10-03

## 2021-10-03 PROBLEM — I63.9 STROKE ABORTED BY ADMINISTRATION OF THROMBOLYTIC AGENT (HCC): Status: RESOLVED | Noted: 2021-10-02 | Resolved: 2021-10-03

## 2021-10-03 PROBLEM — Z92.82 RECEIVED INTRAVENOUS TISSUE PLASMINOGEN ACTIVATOR (TPA) IN EMERGENCY DEPARTMENT: Status: ACTIVE | Noted: 2021-10-03

## 2021-10-03 PROBLEM — E78.5 HYPERLIPIDEMIA LDL GOAL <70: Status: ACTIVE | Noted: 2021-10-03

## 2021-10-03 PROBLEM — I63.512 ACUTE ISCHEMIC LEFT MCA STROKE (HCC): Status: ACTIVE | Noted: 2021-10-02

## 2021-10-03 LAB
ALBUMIN SERPL-MCNC: 4.1 G/DL (ref 3.5–5.2)
ALBUMIN/GLOB SERPL: 1.6 G/DL
ALP SERPL-CCNC: 75 U/L (ref 39–117)
ALT SERPL W P-5'-P-CCNC: 9 U/L (ref 1–33)
ANION GAP SERPL CALCULATED.3IONS-SCNC: 9 MMOL/L (ref 5–15)
AST SERPL-CCNC: 16 U/L (ref 1–32)
BH CV ECHO MEAS - AO MAX PG (FULL): 2.3 MMHG
BH CV ECHO MEAS - AO MAX PG: 4.7 MMHG
BH CV ECHO MEAS - AO MEAN PG (FULL): 1 MMHG
BH CV ECHO MEAS - AO MEAN PG: 2 MMHG
BH CV ECHO MEAS - AO ROOT AREA (BSA CORRECTED): 1.7
BH CV ECHO MEAS - AO ROOT AREA: 6.2 CM^2
BH CV ECHO MEAS - AO ROOT DIAM: 2.8 CM
BH CV ECHO MEAS - AO V2 MAX: 108 CM/SEC
BH CV ECHO MEAS - AO V2 MEAN: 72.3 CM/SEC
BH CV ECHO MEAS - AO V2 VTI: 24.7 CM
BH CV ECHO MEAS - AVA(I,A): 2.2 CM^2
BH CV ECHO MEAS - AVA(I,D): 2.2 CM^2
BH CV ECHO MEAS - AVA(V,A): 2.3 CM^2
BH CV ECHO MEAS - AVA(V,D): 2.3 CM^2
BH CV ECHO MEAS - BSA(HAYCOCK): 1.7 M^2
BH CV ECHO MEAS - BSA: 1.7 M^2
BH CV ECHO MEAS - BZI_BMI: 22.1 KILOGRAMS/M^2
BH CV ECHO MEAS - BZI_METRIC_HEIGHT: 165.1 CM
BH CV ECHO MEAS - BZI_METRIC_WEIGHT: 60.3 KG
BH CV ECHO MEAS - EDV(CUBED): 92.3 ML
BH CV ECHO MEAS - EDV(MOD-SP4): 65.5 ML
BH CV ECHO MEAS - EDV(TEICH): 93.4 ML
BH CV ECHO MEAS - EF(CUBED): 73.6 %
BH CV ECHO MEAS - EF(MOD-SP4): 52.1 %
BH CV ECHO MEAS - EF(TEICH): 65.5 %
BH CV ECHO MEAS - ESV(CUBED): 24.4 ML
BH CV ECHO MEAS - ESV(MOD-SP4): 31.4 ML
BH CV ECHO MEAS - ESV(TEICH): 32.2 ML
BH CV ECHO MEAS - FS: 35.8 %
BH CV ECHO MEAS - IVS/LVPW: 1
BH CV ECHO MEAS - IVSD: 0.74 CM
BH CV ECHO MEAS - LA DIMENSION: 2.7 CM
BH CV ECHO MEAS - LA/AO: 0.96
BH CV ECHO MEAS - LAT PEAK E' VEL: 9.5 CM/SEC
BH CV ECHO MEAS - LV DIASTOLIC VOL/BSA (35-75): 39.4 ML/M^2
BH CV ECHO MEAS - LV MASS(C)D: 101.9 GRAMS
BH CV ECHO MEAS - LV MASS(C)DI: 61.2 GRAMS/M^2
BH CV ECHO MEAS - LV MAX PG: 2.4 MMHG
BH CV ECHO MEAS - LV MEAN PG: 1 MMHG
BH CV ECHO MEAS - LV SYSTOLIC VOL/BSA (12-30): 18.9 ML/M^2
BH CV ECHO MEAS - LV V1 MAX: 77.6 CM/SEC
BH CV ECHO MEAS - LV V1 MEAN: 46.8 CM/SEC
BH CV ECHO MEAS - LV V1 VTI: 17.2 CM
BH CV ECHO MEAS - LVIDD: 4.5 CM
BH CV ECHO MEAS - LVIDS: 2.9 CM
BH CV ECHO MEAS - LVLD AP4: 6.8 CM
BH CV ECHO MEAS - LVLS AP4: 6.1 CM
BH CV ECHO MEAS - LVOT AREA (M): 3.1 CM^2
BH CV ECHO MEAS - LVOT AREA: 3.1 CM^2
BH CV ECHO MEAS - LVOT DIAM: 2 CM
BH CV ECHO MEAS - LVPWD: 0.72 CM
BH CV ECHO MEAS - MED PEAK E' VEL: 8.16 CM/SEC
BH CV ECHO MEAS - MV A MAX VEL: 47.1 CM/SEC
BH CV ECHO MEAS - MV DEC SLOPE: 490 CM/SEC^2
BH CV ECHO MEAS - MV DEC TIME: 0.18 SEC
BH CV ECHO MEAS - MV E MAX VEL: 86.3 CM/SEC
BH CV ECHO MEAS - MV E/A: 1.8
BH CV ECHO MEAS - RAP SYSTOLE: 5 MMHG
BH CV ECHO MEAS - RVSP: 31.2 MMHG
BH CV ECHO MEAS - SI(AO): 91.4 ML/M^2
BH CV ECHO MEAS - SI(CUBED): 40.9 ML/M^2
BH CV ECHO MEAS - SI(LVOT): 32.5 ML/M^2
BH CV ECHO MEAS - SI(MOD-SP4): 20.5 ML/M^2
BH CV ECHO MEAS - SI(TEICH): 36.8 ML/M^2
BH CV ECHO MEAS - SV(AO): 152.1 ML
BH CV ECHO MEAS - SV(CUBED): 68 ML
BH CV ECHO MEAS - SV(LVOT): 54 ML
BH CV ECHO MEAS - SV(MOD-SP4): 34.1 ML
BH CV ECHO MEAS - SV(TEICH): 61.2 ML
BH CV ECHO MEAS - TR MAX VEL: 256 CM/SEC
BH CV ECHO MEASUREMENTS AVERAGE E/E' RATIO: 9.77
BILIRUB SERPL-MCNC: 0.8 MG/DL (ref 0–1.2)
BUN SERPL-MCNC: 11 MG/DL (ref 8–23)
BUN/CREAT SERPL: 18.6 (ref 7–25)
CALCIUM SPEC-SCNC: 8.4 MG/DL (ref 8.6–10.5)
CHLORIDE SERPL-SCNC: 106 MMOL/L (ref 98–107)
CHOLEST SERPL-MCNC: 225 MG/DL (ref 0–200)
CO2 SERPL-SCNC: 24 MMOL/L (ref 22–29)
CREAT SERPL-MCNC: 0.59 MG/DL (ref 0.57–1)
DEPRECATED RDW RBC AUTO: 45.2 FL (ref 37–54)
ERYTHROCYTE [DISTWIDTH] IN BLOOD BY AUTOMATED COUNT: 12.7 % (ref 12.3–15.4)
GFR SERPL CREATININE-BSD FRML MDRD: 101 ML/MIN/1.73
GLOBULIN UR ELPH-MCNC: 2.5 GM/DL
GLUCOSE BLDC GLUCOMTR-MCNC: 91 MG/DL (ref 70–130)
GLUCOSE SERPL-MCNC: 89 MG/DL (ref 65–99)
HCT VFR BLD AUTO: 36.7 % (ref 34–46.6)
HDLC SERPL-MCNC: 89 MG/DL (ref 40–60)
HGB BLD-MCNC: 12.8 G/DL (ref 12–15.9)
LDLC SERPL CALC-MCNC: 125 MG/DL (ref 0–100)
LDLC/HDLC SERPL: 1.38 {RATIO}
LEFT ATRIUM VOLUME INDEX: 28.1 ML/M2
LEFT ATRIUM VOLUME: 46.6 CM3
MAXIMAL PREDICTED HEART RATE: 152 BPM
MCH RBC QN AUTO: 33.9 PG (ref 26.6–33)
MCHC RBC AUTO-ENTMCNC: 34.9 G/DL (ref 31.5–35.7)
MCV RBC AUTO: 97.1 FL (ref 79–97)
PLATELET # BLD AUTO: 292 10*3/MM3 (ref 140–450)
PMV BLD AUTO: 10.6 FL (ref 6–12)
POTASSIUM SERPL-SCNC: 3.6 MMOL/L (ref 3.5–5.2)
PROT SERPL-MCNC: 6.6 G/DL (ref 6–8.5)
QT INTERVAL: 396 MS
QTC INTERVAL: 526 MS
RBC # BLD AUTO: 3.78 10*6/MM3 (ref 3.77–5.28)
SODIUM SERPL-SCNC: 139 MMOL/L (ref 136–145)
STRESS TARGET HR: 129 BPM
TRIGL SERPL-MCNC: 64 MG/DL (ref 0–150)
VIT B12 BLD-MCNC: 928 PG/ML (ref 211–946)
VLDLC SERPL-MCNC: 11 MG/DL (ref 5–40)
WBC # BLD AUTO: 7.64 10*3/MM3 (ref 3.4–10.8)

## 2021-10-03 PROCEDURE — 70450 CT HEAD/BRAIN W/O DYE: CPT

## 2021-10-03 PROCEDURE — 92610 EVALUATE SWALLOWING FUNCTION: CPT | Performed by: SPEECH-LANGUAGE PATHOLOGIST

## 2021-10-03 PROCEDURE — 93306 TTE W/DOPPLER COMPLETE: CPT

## 2021-10-03 PROCEDURE — 70551 MRI BRAIN STEM W/O DYE: CPT

## 2021-10-03 PROCEDURE — 85027 COMPLETE CBC AUTOMATED: CPT | Performed by: PSYCHIATRY & NEUROLOGY

## 2021-10-03 PROCEDURE — 80053 COMPREHEN METABOLIC PANEL: CPT | Performed by: PSYCHIATRY & NEUROLOGY

## 2021-10-03 PROCEDURE — 82962 GLUCOSE BLOOD TEST: CPT

## 2021-10-03 PROCEDURE — 99291 CRITICAL CARE FIRST HOUR: CPT | Performed by: PSYCHIATRY & NEUROLOGY

## 2021-10-03 PROCEDURE — 80061 LIPID PANEL: CPT | Performed by: PSYCHIATRY & NEUROLOGY

## 2021-10-03 PROCEDURE — 93306 TTE W/DOPPLER COMPLETE: CPT | Performed by: EMERGENCY MEDICINE

## 2021-10-03 RX ORDER — LISINOPRIL 20 MG/1
20 TABLET ORAL
Status: DISCONTINUED | OUTPATIENT
Start: 2021-10-03 | End: 2021-10-04 | Stop reason: HOSPADM

## 2021-10-03 RX ADMIN — ASPIRIN 325 MG: 325 TABLET ORAL at 20:17

## 2021-10-03 RX ADMIN — SODIUM CHLORIDE, PRESERVATIVE FREE 10 ML: 5 INJECTION INTRAVENOUS at 08:14

## 2021-10-03 RX ADMIN — ATORVASTATIN CALCIUM 80 MG: 40 TABLET, FILM COATED ORAL at 20:17

## 2021-10-03 NOTE — THERAPY EVALUATION
Acute Care - Speech Language Pathology   Swallow Initial Evaluation [facility]     Patient Name: Jesika Bowers  : 1950  MRN: [id number]  Today's Date: 10/3/2021               Admit Date: 10/2/2021  Clinical bedside swallow evaluation completed. The patient was alert and cooperative. Oriented to person, place, and time. Spouse present at bedside. Spouse reports improvement in speech as patient is now able to speak in full sentences. Noted aphasic moment present with mild word finding difficulty.     Primary problem: Expressive aphasia, gait changes, R sided deficits.     Oral motor assessment completed. Strength and ROM are WFL. During motor commands patient had difficulty completing with apraxic like behaviors orally. Limb apraxia present during self feeding as well. The patient completed a full range of consistencies except for mechanical soft. 1x delayed cough at end of evaluation. Vocal quality remained string and unchanged throughout.     SLP recommends:   1) Regular diet   2) Thin liquids   3) Meds whole as tolerated  4) Oral care and standard swallow precautions     SLP will follow PRN to ensure diet toleration. Will follow up to complete speech/language evaluation as well.   Hanna Rice MS CCC-SLP 10/3/2021 10:06 CDT    Visit Dx:     ICD-10-CM ICD-9-CM   1. Cerebrovascular accident (CVA), unspecified mechanism ([facility])  I63.9 434.91   2. Dysphagia, unspecified type  R13.10 787.20     Patient Active Problem List   Diagnosis   • Stroke aborted by administration of thrombolytic agent ([facility])   • Cerebrovascular accident (CVA) ([facility])     History reviewed. No pertinent past medical history.  Past Surgical History:   Procedure Laterality Date   • HYSTERECTOMY     • OOPHORECTOMY         SLP Recommendation and Plan  SLP Swallowing Diagnosis: R/O pharyngeal dysphagia (10/03/21 0910)  SLP Diet Recommendation: regular textures, thin liquids (10/03/21 0910)  Recommended Precautions and Strategies: upright  posture during/after eating, small bites of food and sips of liquid, general aspiration precautions (10/03/21 0910)  SLP Rec. for Method of Medication Administration: meds whole, as tolerated (10/03/21 0910)     Monitor for Signs of Aspiration: yes, notify SLP if any concerns (10/03/21 0910)  Recommended Diagnostics: SLE/Cog/Motor Speech Evaluation (10/03/21 0910)  Swallow Criteria for Skilled Therapeutic Interventions Met: demonstrates skilled criteria (10/03/21 0910)  Anticipated Discharge Disposition (SLP): unknown (10/03/21 0910)  Rehab Potential/Prognosis, Swallowing: good, to achieve stated therapy goals (10/03/21 0910)  Therapy Frequency (Swallow): PRN (10/03/21 0910)  Predicted Duration Therapy Intervention (Days): 2 days (10/03/21 0910)                         Plan of Care Reviewed With: patient, spouse, other (see comments) (BATOOL Salazar)  Progress: no change (Initial Evaluation)         SWALLOW EVALUATION (last 72 hours)      SLP Adult Swallow Evaluation     Row Name 10/03/21 0910                   Rehab Evaluation    Document Type  evaluation  -MM        Subjective Information  no complaints  -MM        Patient Observations  alert;cooperative;agree to therapy  -MM        Patient/Family/Caregiver Comments/Observations  Spouse present.  -MM        Care Plan Review  evaluation/treatment results reviewed  -MM        Care Plan Review, Other Participant(s)  spouse;other (see comments) BATOOL Salazar   -MM        Patient Effort  good  -MM        Symptoms Noted During/After Treatment  none  -MM           General Information    Patient Profile Reviewed  yes  -MM        Pertinent History Of Current Problem  Primary problem: Expressive aphasia, gait changes, R sided deficits.   -MM        Current Method of Nutrition  NPO  -MM        Precautions/Limitations, Vision  WFL;for purposes of eval  -MM        Precautions/Limitations, Hearing  WFL;for purposes of eval  -MM        Prior Level of Function-Communication  WFL  -MM         Prior Level of Function-Swallowing  no diet consistency restrictions  -MM        Plans/Goals Discussed with  patient;spouse/S.O.;other (see comments);agreed upon RN   -MM        Barriers to Rehab  none identified  -MM        Patient's Goals for Discharge  return to PO diet  -MM        Family Goals for Discharge  patient able to return to PO diet  -MM           Pain    Additional Documentation  Pain Scale: FACES Pre/Post-Treatment (Group)  -MM           Pain Scale: FACES Pre/Post-Treatment    Pain: FACES Scale, Pretreatment  0-->no hurt  -MM        Posttreatment Pain Rating  0-->no hurt  -MM           Oral Motor Structure and Function    Dentition Assessment  natural, present and adequate  -MM        Secretion Management  WNL/WFL  -MM        Mucosal Quality  moist, healthy  -MM        Volitional Swallow  unable to elicit  -MM        Volitional Cough  unable to elicit  -MM           Oral Musculature and Cranial Nerve Assessment    Oral Motor General Assessment  WFL  -MM           General Eating/Swallowing Observations    Respiratory Support Currently in Use  room air  -MM        Eating/Swallowing Skills  fed by SLP;self-fed  -MM        Positioning During Eating  upright in bed  -MM        Utensils Used  spoon;straw  -MM        Consistencies Trialed  pureed;honey-thick liquids;nectar/syrup-thick liquids;thin liquids;regular textures  -MM           Clinical Swallow Eval    Oral Prep Phase  WFL  -MM        Oral Transit  WFL  -MM        Oral Residue  WFL  -MM        Pharyngeal Phase  no overt signs/symptoms of pharyngeal impairment  -MM        Esophageal Phase  unremarkable  -MM        Clinical Swallow Evaluation Summary  See note  -MM           Clinical Impression    Barriers to Overall Progress (SLP)  None  -MM        SLP Swallowing Diagnosis  R/O pharyngeal dysphagia  -MM        Functional Impact  risk of aspiration/pneumonia  -MM        Rehab Potential/Prognosis, Swallowing  good, to achieve stated therapy goals   -MM        Swallow Criteria for Skilled Therapeutic Interventions Met  demonstrates skilled criteria  -MM           Recommendations    Therapy Frequency (Swallow)  PRN  -MM        Predicted Duration Therapy Intervention (Days)  2 days  -MM        SLP Diet Recommendation  regular textures;thin liquids  -MM        Recommended Diagnostics  SLE/Cog/Motor Speech Evaluation  -MM        Recommended Precautions and Strategies  upright posture during/after eating;small bites of food and sips of liquid;general aspiration precautions  -MM        Oral Care Recommendations  Oral Care BID/PRN;Toothbrush  -MM        SLP Rec. for Method of Medication Administration  meds whole;as tolerated  -MM        Monitor for Signs of Aspiration  yes;notify SLP if any concerns  -MM        Anticipated Discharge Disposition (SLP)  unknown  -MM           Swallow Goals (SLP)    Oral Nutrition/Hydration Goal Selection (SLP)  oral nutrition/hydration, SLP goal 1  -MM           Oral Nutrition/Hydration Goal 1 (SLP)    Oral Nutrition/Hydration Goal 1, SLP  Patient will tolerate LRD without s/s of aspiration.  -MM        Time Frame (Oral Nutrition/Hydration Goal 1, SLP)  by discharge  -MM        Barriers (Oral Nutrition/Hydration Goal 1, SLP)  none  -MM        Progress/Outcomes (Oral Nutrition/Hydration Goal 1, SLP)  goal ongoing  -MM          User Key  (r) = Recorded By, (t) = Taken By, (c) = Cosigned By    Initials Name Effective Dates    MM Hanna Rice MS CCC-SLP 06/16/21 -           EDUCATION  The patient has been educated in the following areas:   Dysphagia (Swallowing Impairment) Oral Care/Hydration.       SLP GOALS     Row Name 10/03/21 0910             Oral Nutrition/Hydration Goal 1 (SLP)    Oral Nutrition/Hydration Goal 1, SLP  Patient will tolerate LRD without s/s of aspiration.  -MM      Time Frame (Oral Nutrition/Hydration Goal 1, SLP)  by discharge  -MM      Barriers (Oral Nutrition/Hydration Goal 1, SLP)  none  -MM       Progress/Outcomes (Oral Nutrition/Hydration Goal 1, SLP)  goal ongoing  -MM        User Key  (r) = Recorded By, (t) = Taken By, (c) = Cosigned By    Initials Name Provider Type    Hanna Rousseau MS CCC-SLP Speech and Language Pathologist             Time Calculation:   Time Calculation- SLP     Row Name 10/03/21 1005             Time Calculation- SLP    SLP Start Time  0910  -MM      SLP Stop Time  1006  -MM      SLP Time Calculation (min)  56 min  -MM      SLP Received On  10/03/21  -MM      SLP Goal Re-Cert Due Date  10/13/21  -MM         Untimed Charges    SLP Eval/Re-eval   ST Eval Oral Pharyng Swallow - 85558  -MM      39402-OM Eval Oral Pharyng Swallow Minutes  56  -MM         Total Minutes    Untimed Charges Total Minutes  56  -MM       Total Minutes  56  -MM        User Key  (r) = Recorded By, (t) = Taken By, (c) = Cosigned By    Initials Name Provider Type    Hanna Rousseau MS CCC-SLP Speech and Language Pathologist          Therapy Charges for Today     Code Description Service Date Service Provider Modifiers Qty    [id number]  ST EVAL ORAL PHARYNG SWALLOW 4 10/3/2021 Hanna Rice MS CCC-SLP GN 1               MS KEELY Apodaca  10/3/2021

## 2021-10-03 NOTE — ED NOTES
"Inquired about starting TPA for patient after Dr. Virk asked PA (Sky Vincent) to finish neuro exam.  Dr. Virk stated to this RN, \"Not yet, let me review the chart.\"  Dr. Virk stated to pt and family she would return to discuss TPA.      Ramona Rey, RN  10/02/21 8580    "

## 2021-10-03 NOTE — PLAN OF CARE
Goal Outcome Evaluation:  Plan of Care Reviewed With: patient, spouse, other (see comments) (BATOOL Salazar)        Progress: no change (Initial Evaluation)     Clinical bedside swallow evaluation completed. The patient was alert and cooperative. Oriented to person, place, and time. Spouse present at bedside. Spouse reports improvement in speech as patient is now able to speak in full sentences. Noted aphasic moment present with mild word finding difficulty.     Primary problem: Expressive aphasia, gait changes, R sided deficits.     Oral motor assessment completed. Strength and ROM are WFL. During motor commands patient had difficulty completing with apraxic like behaviors orally. Limb apraxia present during self feeding as well. The patient completed a full range of consistencies except for mechanical soft. 1x delayed cough at end of evaluation. Vocal quality remained string and unchanged throughout.     SLP recommends:   1) Regular diet   2) Thin liquids   3) Meds whole as tolerated  4) Oral care and standard swallow precautions     SLP will follow PRN to ensure diet toleration. Will follow up to complete speech/language evaluation as well.     Hanna Rice MS CCC-SLP 10/3/2021 10:05 CDT

## 2021-10-03 NOTE — CASE MANAGEMENT/SOCIAL WORK
Discharge Planning Assessment  [facility]     Patient Name: Jesika Bowers  MRN: [id number]  Today's Date: 10/3/2021    Admit Date: 10/2/2021    Discharge Needs Assessment     Row Name 10/03/21 1215       Living Environment    Lives With  spouse    Current Living Arrangements  home/apartment/condo    Primary Care Provided by  self    Provides Primary Care For  no one    Family Caregiver if Needed  spouse    Quality of Family Relationships  helpful;involved;supportive    Able to Return to Prior Arrangements  yes       Resource/Environmental Concerns    Resource/Environmental Concerns  none    Transportation Concerns  car, none       Transition Planning    Patient/Family Anticipates Transition to  home    Patient/Family Anticipated Services at Transition  none    Transportation Anticipated  family or friend will provide       Discharge Needs Assessment    Readmission Within the Last 30 Days  no previous admission in last 30 days    Equipment Currently Used at Home  none    Concerns to be Addressed  denies needs/concerns at this time;no discharge needs identified    Anticipated Changes Related to Illness  none    Equipment Needed After Discharge  none    Discharge Coordination/Progress  Pt has RX coverage and a PCP. Pt lived at home with her  prior to admission. SW is awaiting PT OT evals to determine discharge disposition        Discharge Plan    No documentation.       Continued Care and Services - Admitted Since 10/2/2021    Coordination has not been started for this encounter.         Demographic Summary    No documentation.       Functional Status    No documentation.       Psychosocial    No documentation.       Abuse/Neglect    No documentation.       Legal    No documentation.       Substance Abuse    No documentation.       Patient Forms    No documentation.           Tata Sweeney

## 2021-10-03 NOTE — ED NOTES
Dr. Dorman to bs for reevaluation.  Asked Lakia if TPA can be started.  He left room to discuss with Dr. Virk and returned to BS and instructed this RN to start tpa.  Doses calculated with Dr. Dorman and Charge nurse, Rosanna. TPA bolus started at 1811 by this RN.     Ramona Rey, RN  10/02/21 190

## 2021-10-03 NOTE — PLAN OF CARE
VSS. Permissive hypertension. A&Ox4. NIH 4 all shift. Does better when she has been awake for awhile. Self-turn. Continue to monitor.

## 2021-10-03 NOTE — PROGRESS NOTES
Neurology Progress Note      Chief Complaint:    Acute left MCA stroke  Aphasia    Subjective     Subjective:  The patient is seen in CCU bed 7 today.  Her  and nurses present.  She just returned from MRI which confirms left MCA stroke.  Yesterday, she was administered TPA with rapid improvement in her aphasia.  As detailed below, she continues with receptive and expressive aphasia.  Echocardiogram has been completed.  This has not yet been interpreted by cardiology, but initial review of this demonstrates preserved left ventricular systolic function, no significant valvular disease.  I do not yet have the results of the bubble study at this point in time.      The patient has been on estrogen therapy and this is now discontinued.  The patient is on high-dose atorvastatin 80 mg as well as aspirin 325 mg.  LDL is 125, HDL 89 and triglycerides 64.    Blood pressure remains somewhat elevated in the 150s to 160s systolic.  She did have one high reading of 182/86.    Hemoglobin 12.8.  Hemoglobin A1c is not completed.    History reviewed. No pertinent past medical history.  Past Surgical History:   Procedure Laterality Date   • HYSTERECTOMY     • OOPHORECTOMY       Family History   Problem Relation Age of Onset   • Breast cancer Maternal Aunt    • No Known Problems Mother    • No Known Problems Father    • No Known Problems Sister    • No Known Problems Brother    • No Known Problems Daughter    • No Known Problems Son    • No Known Problems Maternal Grandmother    • No Known Problems Paternal Grandmother    • No Known Problems Paternal Aunt    • No Known Problems Other    • BRCA 1/2 Neg Hx    • Colon cancer Neg Hx    • Endometrial cancer Neg Hx    • Ovarian cancer Neg Hx      Social History     Tobacco Use   • Smoking status: Never Smoker   Substance Use Topics   • Alcohol use: Never   • Drug use: Never       Medications:  Current Facility-Administered Medications   Medication Dose Route Frequency Provider Last  Rate Last Admin   • aspirin tablet 325 mg  325 mg Oral Daily Madelyn Dove MD        Or   • aspirin suppository 300 mg  300 mg Rectal Daily Madelyn Dove MD       • atorvastatin (LIPITOR) tablet 80 mg  80 mg Oral Nightly Madelyn Dove MD   80 mg at 10/02/21 2132   • bisacodyl (DULCOLAX) suppository 10 mg  10 mg Rectal Daily PRN Madelyn Dove MD       • labetalol (NORMODYNE,TRANDATE) injection 10 mg  10 mg Intravenous Q10 Min PRN Madelyn Dove MD       • ondansetron (ZOFRAN) injection 4 mg  4 mg Intravenous Q6H PRN Madelyn Dove MD       • sodium chloride 0.9 % flush 10 mL  10 mL Intravenous PRN Madelyn Dove MD       • sodium chloride 0.9 % flush 10 mL  10 mL Intravenous Q12H Madelyn Dove MD   10 mL at 10/03/21 0814   • sodium chloride 0.9 % flush 10 mL  10 mL Intravenous PRN Madelyn Dove MD       • sodium chloride 0.9 % infusion  75 mL/hr Intravenous Continuous Madelyn Dove MD   Stopped at 10/03/21 1110       Allergies:    Gadolinium derivatives    Review of Systems:   -A 14 point review of systems is completed and is negative.      Objective      Vital Signs  Temp:  [97.8 °F (36.6 °C)-98.6 °F (37 °C)] 98.1 °F (36.7 °C)  Heart Rate:  [] 77  Resp:  [13-20] 16  BP: ()/(61-94) 166/83    Physical Exam:    General Exam:  Head:  Normocephalic, atraumatic.  HEENT: PERRLA.  Full EOM.  Neck:  No lymphadenopathy, thyromegaly or bruit.  Cardiac:  Regular rate and rhythm.  Normal S1, S2.  No murmur, rub or gallop.  Lungs:  Clear to auscultation bilaterally.  No wheeze, rales or rhonchi.  Abdomen:  Non-tender, Non-distended.  Bowel sounds normoactive.  Extremities: Full peripheral pulses.  No clubbing, cyanosis or edema.  Skin: No ulceration, breakdown or rash.      Neurologic Exam:  Mental Status:    -Awake. Alert. Oriented to person, place & time.  -Frequent word finding difficulties and word  substitution.  -Combination of receptive and expressive aphasia.  I believe that the receptive aphasia is actually more significant than initially appreciated.  It takes a moment of speaking with her to realize that there is more impairment on the receptive side than is initially suspected.  -No dysarthria.  -Follows simple commands, however, is inconsistent and has frequent errors and requires significant cueing.  Does not follow commands to bend elbow.  I asked her to point at her elbow and she could not do this.     CN II: Difficult to appreciate if she has full visual field.  She does not follow commands for visual field testing accurately.  Pupils equally reactive to light.  CN III, IV, VI:  Extraocular muscles function intact with no nystagmus.  CN V:  Facial sensory is symmetric.  CN VII: Mild right facial motor weakness.  CN VIII:  Gross hearing intact bilaterally.  CN IX/X:  Palate elevates symmetrically.  CN XI:  Shoulder shrug symmetric.  CN XII:  Tongue is midline on protrusion.     Motor: (strength out of 5:  1= minimal movement, 2 = movement in plane of gravity, 3 = movement against gravity, 4 = movement against some resistance, 5 = full strength)     -5/5 in bilateral biceps, triceps, brachioradialis, wrist extensors and intrinsic muscles of the hand.        -5/5 in bilateral hip flexors, quadriceps, hamstrings, gastrocsoleus complex, anterior tibialis and extensor hallucis longus.       Deep Tendon Reflexes:  -Right              Biceps: 2+         Triceps: 2+      Brachioradialis: 2+              Patella: 2+       Ankle: 2+           -Left              Biceps: 2+         Triceps: 2+      Brachioradialis: 2+              Patella: 2+       Ankle: 2+             No pronator drift.    Tone (Modified Cathleen Scale):  No appreciable increase in tone or rigidity noted.     Sensory:  -Intact to light touch, pinprick BUE (C5-T1) and BLE (L2-S1).     Coordination:  -Finger to nose intact BUEs  -Heel to shin  intact BLEs  -No ataxia     Gait  -Not tested due to bed rest following TPA administration.       Results Review:    I reviewed the patient's new clinical results and findings.    MRI obtained this morning and reviewed by me demonstrates the following:  Diffusion restriction in the left posterior MCA.          Assessment/Plan     Impression:  1.  Acute left MCA CVA  2.  Mixed receptive and expressive aphasia  3.  S/P TPA administration  4.  Mixed dyslipidemia/elevated LDL cholesterol  5.  Permissive hypertension      Plan:  1.  Continue aspirin 325 mg daily.  2.  Start lisinopril 20 mg daily for hypertension with a goal of eventually bring her to less than 140/90.  3.  Overweight results of echocardiogram  4.  Continue work with speech therapy.  She passed her swallow study this morning and will be able to resume full diet.  5.  Hemoglobin A1c pending  6.  Continue atorvastatin 80 mg daily.  7.  Cease estrogen therapy  8.  Discussed stroke etiology and sequela with patient and  in detail.  Hers was likely accommodation risk factors to include estrogen therapy and hypertension, uncontrolled as well as dyslipidemia.  9.  Continue cardiac telemetry monitoring on the floor.  10.  Post TPA CT to be done this evening after 1911 HRS.  If this is negative for bleed, the patient will be transferred to the neurology floor and begin therapies.  She likely will not need much in the way of occupational or physical therapy.  Her main focus will be work with speech therapy.    Total of 75 minutes spent in critical care time in the evaluation and management of the patient post TPA with acute left MCA stroke, receptive and expressive aphasia, assessing her Ability to swallow, reviewing MRI, reviewing echocardiogram, reviewing vitals including hypertension, telemetry and instituting new antihypertensive therapy.  Family conference included with this to include risk factor management, etiology of stroke, sequela stroke and  plans for rehabilitation.          Sky Vincent PA-C  10/03/21  12:14 CDT

## 2021-10-03 NOTE — PLAN OF CARE
Goal Outcome Evaluation:   Pt improving throughout the night. NIHSS score at 4. Trouble with expressive aphasia, right sided vision loss, rt sided sensory loss, some right sided limb ataxia but might possibly be contributed to incorrectly following commands. Pt was able to sip water and swallow pills without difficulty. UOP adequate per urinary catheter. VSS, no c/o headache.

## 2021-10-04 ENCOUNTER — TELEPHONE (OUTPATIENT)
Dept: NEUROLOGY | Facility: OTHER | Age: 71
End: 2021-10-04

## 2021-10-04 VITALS
RESPIRATION RATE: 13 BRPM | HEIGHT: 65 IN | DIASTOLIC BLOOD PRESSURE: 89 MMHG | SYSTOLIC BLOOD PRESSURE: 145 MMHG | BODY MASS INDEX: 22.3 KG/M2 | WEIGHT: 133.82 LBS | HEART RATE: 92 BPM | OXYGEN SATURATION: 100 % | TEMPERATURE: 98.6 F

## 2021-10-04 LAB
ALBUMIN SERPL-MCNC: 3.9 G/DL (ref 3.5–5.2)
ALBUMIN/GLOB SERPL: 1.2 G/DL
ALP SERPL-CCNC: 86 U/L (ref 39–117)
ALT SERPL W P-5'-P-CCNC: 10 U/L (ref 1–33)
ANION GAP SERPL CALCULATED.3IONS-SCNC: 8 MMOL/L (ref 5–15)
AST SERPL-CCNC: 19 U/L (ref 1–32)
BILIRUB SERPL-MCNC: 0.8 MG/DL (ref 0–1.2)
BUN SERPL-MCNC: 14 MG/DL (ref 8–23)
BUN/CREAT SERPL: 19.2 (ref 7–25)
CALCIUM SPEC-SCNC: 9 MG/DL (ref 8.6–10.5)
CHLORIDE SERPL-SCNC: 105 MMOL/L (ref 98–107)
CO2 SERPL-SCNC: 27 MMOL/L (ref 22–29)
CREAT SERPL-MCNC: 0.73 MG/DL (ref 0.57–1)
DEPRECATED RDW RBC AUTO: 45.8 FL (ref 37–54)
ERYTHROCYTE [DISTWIDTH] IN BLOOD BY AUTOMATED COUNT: 12.9 % (ref 12.3–15.4)
GFR SERPL CREATININE-BSD FRML MDRD: 79 ML/MIN/1.73
GLOBULIN UR ELPH-MCNC: 3.2 GM/DL
GLUCOSE SERPL-MCNC: 83 MG/DL (ref 65–99)
HCT VFR BLD AUTO: 40.6 % (ref 34–46.6)
HGB BLD-MCNC: 13.5 G/DL (ref 12–15.9)
MCH RBC QN AUTO: 32.5 PG (ref 26.6–33)
MCHC RBC AUTO-ENTMCNC: 33.3 G/DL (ref 31.5–35.7)
MCV RBC AUTO: 97.8 FL (ref 79–97)
PLATELET # BLD AUTO: 329 10*3/MM3 (ref 140–450)
PMV BLD AUTO: 10.4 FL (ref 6–12)
POTASSIUM SERPL-SCNC: 3.7 MMOL/L (ref 3.5–5.2)
PROT SERPL-MCNC: 7.1 G/DL (ref 6–8.5)
RBC # BLD AUTO: 4.15 10*6/MM3 (ref 3.77–5.28)
SODIUM SERPL-SCNC: 140 MMOL/L (ref 136–145)
WBC # BLD AUTO: 7.77 10*3/MM3 (ref 3.4–10.8)

## 2021-10-04 PROCEDURE — 97116 GAIT TRAINING THERAPY: CPT

## 2021-10-04 PROCEDURE — 80053 COMPREHEN METABOLIC PANEL: CPT | Performed by: PSYCHIATRY & NEUROLOGY

## 2021-10-04 PROCEDURE — 99239 HOSP IP/OBS DSCHRG MGMT >30: CPT | Performed by: PSYCHIATRY & NEUROLOGY

## 2021-10-04 PROCEDURE — 85027 COMPLETE CBC AUTOMATED: CPT | Performed by: PSYCHIATRY & NEUROLOGY

## 2021-10-04 PROCEDURE — 97535 SELF CARE MNGMENT TRAINING: CPT | Performed by: OCCUPATIONAL THERAPIST

## 2021-10-04 PROCEDURE — 97162 PT EVAL MOD COMPLEX 30 MIN: CPT

## 2021-10-04 PROCEDURE — 97165 OT EVAL LOW COMPLEX 30 MIN: CPT | Performed by: OCCUPATIONAL THERAPIST

## 2021-10-04 RX ORDER — ATORVASTATIN CALCIUM 80 MG/1
80 TABLET, FILM COATED ORAL NIGHTLY
Qty: 30 TABLET | Refills: 1 | Status: SHIPPED | OUTPATIENT
Start: 2021-10-04 | End: 2022-01-25 | Stop reason: ALTCHOICE

## 2021-10-04 RX ORDER — ASPIRIN 325 MG
325 TABLET ORAL DAILY
Qty: 30 TABLET | Refills: 1 | Status: SHIPPED | OUTPATIENT
Start: 2021-10-05 | End: 2022-01-25 | Stop reason: ALTCHOICE

## 2021-10-04 RX ADMIN — SODIUM CHLORIDE, PRESERVATIVE FREE 10 ML: 5 INJECTION INTRAVENOUS at 08:13

## 2021-10-04 RX ADMIN — ASPIRIN 325 MG: 325 TABLET ORAL at 08:11

## 2021-10-04 NOTE — PLAN OF CARE
"Goal Outcome Evaluation:  Plan of Care Reviewed With: patient           Outcome Summary: PT vamshi completed.  Pt highly motivated and pleasant, agreeable to therapy.  Pt reports she feels like she has improved, but still continues w/ some \"quirks.\"  Pt reports her biggest c/os is the R hand weakness.  Pt w/ decrease sensation in the R UE and R upper leg compared to the L side and R lower leg.  Pt demonstrated difficulty w/ motor planning requiring verbal cues, demonstration, and non verbal cues to complete activities accurately that were new or not routine for the pt.   However, pt able to do daily routine activities I w/out any difficulty, for example tying her shoes.  Pt also now w/ R peripheral vision deficits.  Pt education for safety/falls prevention w/ emphasis on increase awareness of environment.  Pt w/ good balance in sitting and standing, however due to visual field deficit fall risk increases.  PT to continue for continued education for safety/falls prevention and to further assess gait ability w/ emphasis on steps.  Anticipate pt will return home w/ assist at discharge.  Recommend continued OP therapy w/ emphasis on OT services when discharged.  "

## 2021-10-04 NOTE — THERAPY EVALUATION
Patient Name: Jesika Bowers  : 1950    MRN: [id number]                              Today's Date: 10/4/2021       Admit Date: 10/2/2021    Visit Dx:     ICD-10-CM ICD-9-CM   1. Cerebrovascular accident (CVA), unspecified mechanism (HCC)  I63.9 434.91   2. Dysphagia, unspecified type  R13.10 787.20   3. Acute ischemic left MCA stroke (HCC)  I63.512 434.91   4. Impaired mobility and ADLs  Z74.09 V49.89    Z78.9      Patient Active Problem List   Diagnosis   • Acute ischemic left MCA stroke (HCC)   • Received intravenous tissue plasminogen activator (tPA) in emergency department   • Hyperlipidemia LDL goal <70     History reviewed. No pertinent past medical history.  Past Surgical History:   Procedure Laterality Date   • HYSTERECTOMY     • OOPHORECTOMY       General Information     Row Name 10/04/21 0820          OT Time and Intention    Document Type  evaluation Pt presented to ED with expressive aphasia, decreased standing, and decreased sensation of the R UE. Dx: acute L MCA ischemia.  -JJ     Mode of Treatment  occupational therapy  -JJ     Row Name 10/04/21 0820          General Information    Patient Profile Reviewed  yes  -JJ     Prior Level of Function  independent:;all household mobility;community mobility;transfer;bed mobility;ADL's  -JJ     Existing Precautions/Restrictions  fall  -JJ     Barriers to Rehab  visual deficit  -JJ     Row Name 10/04/21 0820          Occupational Profile    Environmental Supports and Barriers (Occupational Profile)  shower with built in seat  -JJ     Row Name 10/04/21 0820          Living Environment    Lives With  spouse  -JJ     Row Name 10/04/21 0820          Home Main Entrance    Number of Stairs, Main Entrance  four  -JJ     Stair Railings, Main Entrance  none  -JJ     Row Name 10/04/21 0820          Stairs Within Home, Primary    Number of Stairs, Within Home, Primary  none has three story home, but pt reports that she can live on main level as needed.  -JJ     Row  Name 10/04/21 0820          Cognition    Orientation Status (Cognition)  oriented x 4  -     Row Name 10/04/21 0820          Safety Issues, Functional Mobility    Impairments Affecting Function (Mobility)  motor planning;strength;visual/perceptual  -       User Key  (r) = Recorded By, (t) = Taken By, (c) = Cosigned By    Initials Name Provider Type    Ary Head OTR/L Occupational Therapist          Mobility/ADL's     Row Name 10/04/21 0832          Bed Mobility    Bed Mobility  supine-sit  -J     Supine-Sit [place] (Bed Mobility)  modified independence  -     Assistive Device (Bed Mobility)  head of bed elevated  -     Row Name 10/04/21 0832          Transfers    Transfers  sit-stand transfer  -     Sit-Stand [place] (Transfers)  standby assist  -     Row Name 10/04/21 0832          Functional Mobility    Functional Mobility- Ind. Level  standby assist  -     Row Name 10/04/21 0832          Activities of Daily Living    BADL Assessment/Intervention  lower body dressing;upper body dressing  -     Row Name 10/04/21 0832          Lower Body Dressing Assessment/Training    [place] Level (Lower Body Dressing)  don;shoes/slippers;independent  -     Position (Lower Body Dressing)  edge of bed sitting  -     Comment (Lower Body Dressing)  pt able to don shoes and tie laces independently.  -     Row Name 10/04/21 0832          Upper Body Dressing Assessment/Training    Comment (Upper Body Dressing)  Pt reports independently completing full upbody dressing prior to eval.  -       User Key  (r) = Recorded By, (t) = Taken By, (c) = Cosigned By    Initials Name Provider Type    Ary Head OTR/L Occupational Therapist        Obj/Interventions     Row Name 10/04/21 0820          Sensory Assessment (Somatosensory)    Sensory Assessment (Somatosensory)  right UE  -     Right UE Sensory Assessment  light touch awareness  -     Row Name 10/04/21 0820          Sensory  Interventions    Comment, Sensory Intervention  slightly deminished sensation in R UE as compared to L UE.  -     Row Name 10/04/21 0820          Vision Assessment/Intervention    Visual Impairment/Limitations  peripheral vision impaired right  -     Vision Assessment Comment  able to track in all directions. Impaired peripheral vision R.  -     Row Name 10/04/21 0820          Range of Motion Comprehensive    General Range of Motion  bilateral upper extremity ROM WFL  -     Row Name 10/04/21 0820          Strength Comprehensive (MMT)    Comment, General Manual Muscle Testing (MMT) Assessment  B UE strength 5/5, except R  strength 4/5. Dynamometer L: 40 mmHg; R hand 35 mmHg  -     Row Name 10/04/21 0820          Balance    Balance Assessment  sitting static balance;standing static balance  -     Static Sitting Balance  WNL;unsupported;sitting, edge of bed  -     Static Standing Balance  WFL;unsupported;standing  -       User Key  (r) = Recorded By, (t) = Taken By, (c) = Cosigned By    Initials Name Provider Type    Ary Head OTR/L Occupational Therapist        Goals/Plan     Row Name 10/04/21 0820          Self-Feeding Goal 1 (OT)    Activity/Device (Self-Feeding Goal 1, OT)  self-feeding skills, all  -JJ     [place] Level/Cues Needed (Self-Feeding Goal 1, OT)  independent  -JJ     Time Frame (Self-Feeding Goal 1, OT)  long term goal (LTG);by discharge  -JJ     Progress/Outcomes (Self-Feeding Goal 1, OT)  goal ongoing  -     Row Name 10/04/21 0820          Therapy Assessment/Plan (OT)    Planned Therapy Interventions (OT)  cognitive/visual perception retraining;BADL retraining;occupation/activity based interventions;neuromuscular control/coordination retraining;patient/caregiver education/training;transfer/mobility retraining  -J       User Key  (r) = Recorded By, (t) = Taken By, (c) = Cosigned By    Initials Name Provider Type    Ary Head OTR/MILAGROS Occupational  Therapist        Clinical Impression     Row Name 10/04/21 0820          Pain Assessment    Additional Documentation  Pain Scale: Numbers Pre/Post-Treatment (Group)  -J     Row Name 10/04/21 0820          Pain Scale: Numbers Pre/Post-Treatment    Pretreatment Pain Rating  0/10 - no pain  -JJ     Posttreatment Pain Rating  0/10 - no pain  -JJ     Row Name 10/04/21 0820          Plan of Care Review    Plan of Care Reviewed With  patient  -J     Outcome Summary  OT eval completed. Pt is alert and oriented x4. Pt reports improved speech this am, since initial admit on 10/2. Pt demonstrated deminished sensation in R UE, impaired motor planning, and impaired R peripheral vision. She was able to complete bed mobility, sit <> stand t/f from EOB and all mobility with SBA/CGA. She was able to track in all directions but has R sided peripheral filed deficits. GMC/FMC impaired R side, requiring verbal cues and demonstration to complete formal assessment. B UE strength 5/5. She would benefit from skilled OT services to address these deficits. Recommend d/c home with assist and OP services to address these deficits.  -J     Row Name 10/04/21 0820          Therapy Assessment/Plan (OT)    Patient/Family Therapy Goal Statement (OT)  return home  -     Rehab Potential (OT)  good, to achieve stated therapy goals  -     Criteria for Skilled Therapeutic Interventions Met (OT)  yes;skilled treatment is necessary  -     Therapy Frequency (OT)  5 times/wk  -     Predicted Duration of Therapy Intervention (OT)  10 days  -J     Row Name 10/04/21 0820          Therapy Plan Review/Discharge Plan (OT)    Anticipated Discharge Disposition (OT)  home with assist;home with outpatient therapy services  -     Row Name 10/04/21 0820          Vital Signs    Pre Patient Position  Supine  -JJ     Intra Patient Position  Standing  -JJ     Post Patient Position  Sitting  -JJ     Row Name 10/04/21 0820          Positioning and Restraints     Pre-Treatment Position  in bed  -J     Post Treatment Position  chair  -JJ     In Chair  notified nsg;sitting;call light within reach;encouraged to call for assist;patient within staff view  -       User Key  (r) = Recorded By, (t) = Taken By, (c) = Cosigned By    Initials Name Provider Type    Ary Banda OTR/L Occupational Therapist        Outcome Measures     Row Name 10/04/21 0820          How much help from another is currently needed...    Putting on and taking off regular lower body clothing?  3  -JJ     Bathing (including washing, rinsing, and drying)  3  -JJ     Toileting (which includes using toilet bed pan or urinal)  3  -JJ     Putting on and taking off regular upper body clothing  3  -JJ     Taking care of personal grooming (such as brushing teeth)  3  -JJ     Eating meals  3  -     AM-PAC 6 Clicks Score (OT)  18  -     Row Name 10/04/21 0900 10/04/21 0820       Functional Assessment    Outcome Measure Options  AM-PAC 6 Clicks Daily Activity (OT)  -  AM-PAC 6 Clicks Daily Activity (OT)  -      User Key  (r) = Recorded By, (t) = Taken By, (c) = Cosigned By    Initials Name Provider Type    Ary Head OTR/L Occupational Therapist          Occupational Therapy Education                 Title: PT OT SLP Therapies (Resolved)     Topic: Occupational Therapy (Resolved)     Point: ADL training (Resolved)     Description:   Instruct learner(s) on proper safety adaptation and remediation techniques during self care or transfers.   Instruct in proper use of assistive devices.              Learning Progress Summary           Patient Acceptance, E, VU by LIAN at 10/4/2021 0907                               User Key     Initials Effective Dates Name Provider Type Discipline     06/16/21 -  Ary Hurd OTR/L Occupational Therapist OT              OT Recommendation and Plan  Planned Therapy Interventions (OT): cognitive/visual perception retraining, BADL retraining,  occupation/activity based interventions, neuromuscular control/coordination retraining, patient/caregiver education/training, transfer/mobility retraining  Therapy Frequency (OT): 5 times/wk  Plan of Care Review  Plan of Care Reviewed With: patient  Outcome Summary: OT eval completed. Pt is alert and oriented x4. Pt reports improved speech this am, since initial admit on 10/2. Pt demonstrated deminished sensation in R UE, impaired motor planning, and impaired R peripheral vision. She was able to complete bed mobility, sit <> stand t/f from EOB and all mobility with SBA/CGA. She was able to track in all directions but has R sided peripheral filed deficits. GMC/FMC impaired R side, requiring verbal cues and demonstration to complete formal assessment. B UE strength 5/5. She would benefit from skilled OT services to address these deficits. Recommend d/c home with assist and OP services to address these deficits.     Time Calculation:   Time Calculation- OT     Row Name 10/04/21 1325             Time Calculation- OT    OT Start Time  0820 add 8 minutes for chart review  -      OT Stop Time  0925  -      OT Time Calculation (min)  65 min  -SUZAN      OT Received On  10/04/21  -SUZAN      OT Goal Re-Cert Due Date  10/14/21  -        User Key  (r) = Recorded By, (t) = Taken By, (c) = Cosigned By    Initials Name Provider Type    Ary Head OTR/L Occupational Therapist        Therapy Charges for Today     Code Description Service Date Service Provider Modifiers Qty    [id number]  OT EVAL LOW COMPLEXITY 4 10/4/2021 Ary Hurd OTR/L GO 1    [id number]  OT SELF CARE/MGMT/TRAIN EA 15 MIN 10/4/2021 Ary Hurd OTR/L GO 1               HUA Murdock/MILAGROS  10/4/2021

## 2021-10-04 NOTE — PLAN OF CARE
VSS. Patient seen and assessed by Dr. Monge. He determined she is safe for discharge. Plans for outpatient PT/SLP. See AVS for education

## 2021-10-04 NOTE — DISCHARGE SUMMARY
Date of Admission: 10/2/2021  Date of Discharge:  10/4/2021    Admitting Diagnosis: Stroke  Discharge Diagnosis:   1.  Acute left MCA CVA  2.  Mixed receptive and expressive aphasia  3.  S/P TPA administration  4.  Mixed dyslipidemia/elevated LDL cholesterol  5.  Permissive hypertension    Procedures Performed         Consults:   Consults     No orders found from 9/3/2021 to 10/3/2021.          Presenting Problem/History of Present Illness  Stroke aborted by administration of thrombolytic agent (HCC) [I63.9]  Cerebrovascular accident (CVA), unspecified mechanism (HCC) [I63.9]     Pertinent Test Results:   Lab Results (last 72 hours)     Procedure Component Value Units Date/Time    Comprehensive Metabolic Panel [[id number]] Collected: 10/04/21 0646    Specimen: Blood Updated: 10/04/21 0800     Glucose 83 mg/dL      BUN 14 mg/dL      Creatinine 0.73 mg/dL      Sodium 140 mmol/L      Potassium 3.7 mmol/L      Chloride 105 mmol/L      CO2 27.0 mmol/L      Calcium 9.0 mg/dL      Total Protein 7.1 g/dL      Albumin 3.90 g/dL      ALT (SGPT) 10 U/L      AST (SGOT) 19 U/L      Alkaline Phosphatase 86 U/L      Total Bilirubin 0.8 mg/dL      eGFR Non African Amer 79 mL/min/1.73      Globulin 3.2 gm/dL      A/G Ratio 1.2 g/dL      BUN/Creatinine Ratio 19.2     Anion Gap 8.0 mmol/L     Narrative:      GFR Normal >60  Chronic Kidney Disease <60  Kidney Failure <15      CBC (No Diff) [[id number]]  (Abnormal) Collected: 10/04/21 0646    Specimen: Blood Updated: 10/04/21 0734     WBC 7.77 10*3/mm3      RBC 4.15 10*6/mm3      Hemoglobin 13.5 g/dL      Hematocrit 40.6 %      MCV 97.8 fL      MCH 32.5 pg      MCHC 33.3 g/dL      RDW 12.9 %      RDW-SD 45.8 fl      MPV 10.4 fL      Platelets 329 10*3/mm3     Vitamin B12 [[id number]]  (Normal) Collected: 10/02/21 1701    Specimen: Blood Updated: 10/03/21 1231     Vitamin B-12 928 pg/mL     Narrative:      Results may be falsely increased if patient taking Biotin.      Comprehensive  Metabolic Panel [[id number]]  (Abnormal) Collected: 10/03/21 0354    Specimen: Blood Updated: 10/03/21 0434     Glucose 89 mg/dL      BUN 11 mg/dL      Creatinine 0.59 mg/dL      Sodium 139 mmol/L      Potassium 3.6 mmol/L      Chloride 106 mmol/L      CO2 24.0 mmol/L      Calcium 8.4 mg/dL      Total Protein 6.6 g/dL      Albumin 4.10 g/dL      ALT (SGPT) 9 U/L      AST (SGOT) 16 U/L      Alkaline Phosphatase 75 U/L      Total Bilirubin 0.8 mg/dL      eGFR Non African Amer 101 mL/min/1.73      Globulin 2.5 gm/dL      A/G Ratio 1.6 g/dL      BUN/Creatinine Ratio 18.6     Anion Gap 9.0 mmol/L     Narrative:      GFR Normal >60  Chronic Kidney Disease <60  Kidney Failure <15      Lipid Panel [[id number]]  (Abnormal) Collected: 10/03/21 0354    Specimen: Blood Updated: 10/03/21 0434     Total Cholesterol 225 mg/dL      Triglycerides 64 mg/dL      HDL Cholesterol 89 mg/dL      LDL Cholesterol  125 mg/dL      VLDL Cholesterol 11 mg/dL      LDL/HDL Ratio 1.38    Narrative:      Cholesterol Reference Ranges  (U.S. Department of Health and Human Services ATP III Classifications)    Desirable          <200 mg/dL  Borderline High    200-239 mg/dL  High Risk          >240 mg/dL      Triglyceride Reference Ranges  (U.S. Department of Health and Human Services ATP III Classifications)    Normal           <150 mg/dL  Borderline High  150-199 mg/dL  High             200-499 mg/dL  Very High        >500 mg/dL    HDL Reference Ranges  (U.S. Department of Health and Human Services ATP III Classifcations)    Low     <40 mg/dl (major risk factor for CHD)  High    >60 mg/dl ('negative' risk factor for CHD)        LDL Reference Ranges  (U.S. Department of Health and Human Services ATP III Classifcations)    Optimal          <100 mg/dL  Near Optimal     100-129 mg/dL  Borderline High  130-159 mg/dL  High             160-189 mg/dL  Very High        >189 mg/dL    CBC (No Diff) [[id number]]  (Abnormal) Collected: 10/03/21 0354    Specimen:  Blood Updated: 10/03/21 0416     WBC 7.64 10*3/mm3      RBC 3.78 10*6/mm3      Hemoglobin 12.8 g/dL      Hematocrit 36.7 %      MCV 97.1 fL      MCH 33.9 pg      MCHC 34.9 g/dL      RDW 12.7 %      RDW-SD 45.2 fl      MPV 10.6 fL      Platelets 292 10*3/mm3     POC Glucose Once [[id number]]  (Normal) Collected: 10/03/21 0038    Specimen: Blood Updated: 10/03/21 0049     Glucose 91 mg/dL      Comment: : [id number] Christy Springer ID: [id number]       Comprehensive Metabolic Panel [[id number]]  (Abnormal) Collected: 10/02/21 2015    Specimen: Blood Updated: 10/02/21 2042     Glucose 83 mg/dL      BUN 15 mg/dL      Creatinine 0.68 mg/dL      Sodium 139 mmol/L      Potassium 3.7 mmol/L      Chloride 102 mmol/L      CO2 21.0 mmol/L      Calcium 9.3 mg/dL      Total Protein 7.4 g/dL      Albumin 4.30 g/dL      ALT (SGPT) 11 U/L      AST (SGOT) 19 U/L      Alkaline Phosphatase 83 U/L      Total Bilirubin 0.7 mg/dL      eGFR Non African Amer 86 mL/min/1.73      Globulin 3.1 gm/dL      A/G Ratio 1.4 g/dL      BUN/Creatinine Ratio 22.1     Anion Gap 16.0 mmol/L     Narrative:      GFR Normal >60  Chronic Kidney Disease <60  Kidney Failure <15      CBC (No Diff) [[id number]]  (Normal) Collected: 10/02/21 2015    Specimen: Blood Updated: 10/02/21 2025     WBC 9.33 10*3/mm3      RBC 4.34 10*6/mm3      Hemoglobin 14.1 g/dL      Hematocrit 41.8 %      MCV 96.3 fL      MCH 32.5 pg      MCHC 33.7 g/dL      RDW 12.5 %      RDW-SD 44.6 fl      MPV 10.1 fL      Platelets 311 10*3/mm3     TSH [[id number]]  (Normal) Collected: 10/02/21 1701    Specimen: Blood Updated: 10/02/21 1903     TSH 3.910 uIU/mL     Lipid Panel [[id number]]  (Abnormal) Collected: 10/02/21 1701    Specimen: Blood Updated: 10/02/21 1857     Total Cholesterol 252 mg/dL      Triglycerides 136 mg/dL      HDL Cholesterol 94 mg/dL      LDL Cholesterol  135 mg/dL      VLDL Cholesterol 23 mg/dL      LDL/HDL Ratio 1.39    Narrative:      Cholesterol Reference Ranges  (U.S.  Department of Health and Human Services ATP III Classifications)    Desirable          <200 mg/dL  Borderline High    200-239 mg/dL  High Risk          >240 mg/dL      Triglyceride Reference Ranges  (U.S. Department of Health and Human Services ATP III Classifications)    Normal           <150 mg/dL  Borderline High  150-199 mg/dL  High             200-499 mg/dL  Very High        >500 mg/dL    HDL Reference Ranges  (U.S. Department of Health and Human Services ATP III Classifcations)    Low     <40 mg/dl (major risk factor for CHD)  High    >60 mg/dl ('negative' risk factor for CHD)        LDL Reference Ranges  (U.S. Department of Health and Human Services ATP III Classifcations)    Optimal          <100 mg/dL  Near Optimal     100-129 mg/dL  Borderline High  130-159 mg/dL  High             160-189 mg/dL  Very High        >189 mg/dL    Hemoglobin A1c [[id number]]  (Normal) Collected: 10/02/21 1701    Specimen: Blood Updated: 10/02/21 1850     Hemoglobin A1C 5.30 %     Narrative:      Hemoglobin A1C Ranges:    Increased Risk for Diabetes  5.7% to 6.4%  Diabetes                     >= 6.5%  Diabetic Goal                < 7.0%    COVID PRE-OP / PRE-PROCEDURE SCREENING ORDER (NO ISOLATION) - Swab, Nasal Cavity [[id number]]  (Normal) Collected: 10/02/21 1741    Specimen: Swab from Nasal Cavity Updated: 10/02/21 1824    Narrative:      The following orders were created for panel order COVID PRE-OP / PRE-PROCEDURE SCREENING ORDER (NO ISOLATION) - Swab, Nasal Cavity.  Procedure                               Abnormality         Status                     ---------                               -----------         ------                     COVID-19,[facility]...[[id number]]  Normal              Final result                 Please view results for these tests on the individual orders.    COVID-19,[facility] IN-HOUSE,Nasal Swab No Transport Media 3-4 HR TAT - Swab, Nasal Cavity [[id number]]  (Normal) Collected: 10/02/21 1741     Specimen: Swab from Nasal Cavity Updated: 10/02/21 1825     COVID19 Not Detected    Narrative:      Fact sheet for providers: https://www.fda.gov/media/190449/download     Fact sheet for patients: https://www.fda.gov/media/222605/download    Test performed by PCR.    Consider negative results in combination with clinical observations, patient history, and epidemiological information.    POC Glucose Once [[id number]]  (Normal) Collected: 10/02/21 1809    Specimen: Blood Updated: 10/02/21 1820     Glucose 71 mg/dL      Comment: : [id number] Candido GreeneMeter ID: [id number]       [facility] [[id number]] Collected: 10/02/21 1701    Specimen: Blood Updated: 10/02/21 1815    Narrative:      The following orders were created for panel order [facility].  Procedure                               Abnormality         Status                     ---------                               -----------         ------                     Green Top (Gel)[[id number]]                                  Final result               Lavender Top[[id number]]                                     Final result               Red Top[[id number]]                                          Final result               Light Blue Top[[id number]]                                   Final result                 Please view results for these tests on the individual orders.    Red Top [[id number]] Collected: 10/02/21 1701    Specimen: Blood Updated: 10/02/21 1815     Extra Tube Hold for add-ons.     Comment: Auto resulted.       Light Blue Top [[id number]] Collected: 10/02/21 1701    Specimen: Blood Updated: 10/02/21 1815     Extra Tube hold for add-on     Comment: Auto resulted       Green Top (Gel) [[id number]] Collected: 10/02/21 1701    Specimen: Blood Updated: 10/02/21 1815     Extra Tube Hold for add-ons.     Comment: Auto resulted.       Lavender Top [[id number]] Collected: 10/02/21 1701    Specimen: Blood Updated: 10/02/21 1815     Extra Tube hold for  add-on     Comment: Auto resulted       Comprehensive Metabolic Panel [[id number]]  (Abnormal) Collected: 10/02/21 1701    Specimen: Blood Updated: 10/02/21 1738     Glucose 97 mg/dL      BUN 16 mg/dL      Creatinine 0.72 mg/dL      Sodium 138 mmol/L      Potassium 3.5 mmol/L      Chloride 99 mmol/L      CO2 22.0 mmol/L      Calcium 9.4 mg/dL      Total Protein 7.6 g/dL      Albumin 4.60 g/dL      ALT (SGPT) 10 U/L      AST (SGOT) 18 U/L      Alkaline Phosphatase 89 U/L      Total Bilirubin 0.5 mg/dL      eGFR Non African Amer 81 mL/min/1.73      Globulin 3.0 gm/dL      A/G Ratio 1.5 g/dL      BUN/Creatinine Ratio 22.2     Anion Gap 17.0 mmol/L     Narrative:      GFR Normal >60  Chronic Kidney Disease <60  Kidney Failure <15      Troponin [[id number]]  (Normal) Collected: 10/02/21 1701    Specimen: Blood Updated: 10/02/21 1735     Troponin T <0.010 ng/mL     Narrative:      Troponin T Reference Range:  <= 0.03 ng/mL-   Negative for AMI  >0.03 ng/mL-     Abnormal for myocardial necrosis.  Clinicians would have to utilize clinical acumen, EKG, Troponin and serial changes to determine if it is an Acute Myocardial Infarction or myocardial injury due to an underlying chronic condition.       Results may be falsely decreased if patient taking Biotin.      Protime-INR [[id number]]  (Abnormal) Collected: 10/02/21 1701    Specimen: Blood Updated: 10/02/21 1729     Protime 11.6 Seconds      INR 0.88    aPTT [[id number]]  (Normal) Collected: 10/02/21 1701    Specimen: Blood Updated: 10/02/21 1729     PTT 26.4 seconds     CBC & Differential [[id number]]  (Normal) Collected: 10/02/21 1701    Specimen: Blood Updated: 10/02/21 1719    Narrative:      The following orders were created for panel order CBC & Differential.  Procedure                               Abnormality         Status                     ---------                               -----------         ------                     CBC Auto Differential[[id number]]         Normal              Final result                 Please view results for these tests on the individual orders.    CBC Auto Differential [[id number]]  (Normal) Collected: 10/02/21 1701    Specimen: Blood Updated: 10/02/21 1719     WBC 6.95 10*3/mm3      RBC 3.90 10*6/mm3      Hemoglobin 12.8 g/dL      Hematocrit 37.4 %      MCV 95.9 fL      MCH 32.8 pg      MCHC 34.2 g/dL      RDW 12.7 %      RDW-SD 44.0 fl      MPV 10.4 fL      Platelets 343 10*3/mm3      Neutrophil % 49.6 %      Lymphocyte % 42.3 %      Monocyte % 5.5 %      Eosinophil % 1.9 %      Basophil % 0.4 %      Immature Grans % 0.3 %      Neutrophils, Absolute 3.45 10*3/mm3      Lymphocytes, Absolute 2.94 10*3/mm3      Monocytes, Absolute 0.38 10*3/mm3      Eosinophils, Absolute 0.13 10*3/mm3      Basophils, Absolute 0.03 10*3/mm3      Immature Grans, Absolute 0.02 10*3/mm3      nRBC 0.0 /100 WBC     POC Glucose Once [[id number]]  (Abnormal) Collected: 10/02/21 1700    Specimen: Blood Updated: 10/02/21 1712     Glucose 61 mg/dL      Comment: : [id number] RiversOklahoma Hospital Associationter ID: [id number]           Imaging Results (Last 72 Hours)     Procedure Component Value Units Date/Time    CT Head Without Contrast [[id number]] Collected: 10/03/21 1938     Updated: 10/03/21 1944    Narrative:      EXAMINATION:   CT HEAD WO CONTRAST-  10/3/2021 7:38 PM CDT     HISTORY: CT BRAIN without contrast dated 10/3/2021 7:22 PM CDT     HISTORY: Stroke     COMPARISON: CT perfusion October 2, 2021 and CT head October 2, 2020      DOSE LENGTH PRODUCT: 553 mGy cm     TECHNIQUE: Serial axial tomographic images of the brain were obtained  without the use of intravenous contrast.      FINDINGS:   The midline structures are nondisplaced. The ventricles and basilar  cisterns are normal in size and configuration. There is no evidence of  intracranial hemorrhage or mass-effect. Decreased CT attenuation in the  left posterior parietal region which corresponds with the region of  the  infarction described on prior CT perfusion scan.. The sulci are slightly  effaced over the region of ischemic infarction and edema.. The  structures of the posterior fossa are unremarkable.      The included orbits and their contents are unremarkable. The visualized  paranasal sinuses, mastoid air cells and middle ear cavities are clear.  The visualized osseous structures and overlying soft tissues of the  skull and face are unremarkable.        Impression:      1. Left posterior parietal ischemic infarction. There is no associated  hemorrhage        This report was finalized on 10/03/2021 19:41 by Dr. Leo Galindo MD.    MRI Brain Without Contrast [[id number]] Collected: 10/03/21 1257     Updated: 10/03/21 1308    Narrative:      EXAMINATION:  MRI BRAIN WO CONTRAST-  10/3/2021 10:11 AM CDT     HISTORY: Stroke, follow up; I63.9-Cerebral infarction, unspecified;  R13.10-Dysphagia, unspecified      COMPARISON: Head CT dated 10/2/2021     TECHNIQUE: Multiplanar MR imaging of the brain was performed.     FINDINGS:      There is restricted diffusion compatible with acute ischemic  change/infarction in the left parietal lobe cortex, superiorly above the  lateral ventricle.     There is minimal punctate restricted diffusion signal changes also  identified in the occipital lobes, bilaterally, cortical/subcortical  region. Corresponding FLAIR signal is appreciated. Bibasilar compatible  with additional acute ischemic change/infarction     There is also mild linear restricted diffusion in the left frontal lobe  cortex, far anteriorly at the level of the frontal horns. There is  corresponding FLAIR signal abnormality. Again additional acute ischemic  change/infarction considered.     There is no abnormal intra-articular extra-axial blood products.     There are minimal chronic ischemic white matter changes in the  periventricular and subcortical regions.     There is no mass effect or midline shift. There is no  hydrocephalus.     The globes and intraorbital structures are imaged symmetrically.     The pituitary gland is not enlarged.     Cervical spine imaged in part is unremarkable.             Impression:      1. Left parietal lobe infarct.  2. Small Additional areas of acute ischemic change/infarction in the  cortical subcortical occipital lobes bilaterally and the left frontal  lobe far anteriorly.  This report was finalized on 10/03/2021 13:05 by Dr. Jim Bowie MD.    XR Chest 1 View [[id number]] Collected: 10/03/21 1053     Updated: 10/03/21 1056    Narrative:      EXAMINATION: XR CHEST 1 VW-. 10/3/2021 10:53 AM CDT     CHEST, ONE VIEW:     HISTORY: Stroke symptoms     COMPARISON: None     A single frontal chest radiograph was obtained.     FINDINGS:     The lungs are clear without infiltrates.     The heart is normal in size, pulmonary circulation appropriate, without  heart failure.     The bony structures are intact without acute osseous abnormality.                                     Impression:      1. No acute cardiopulmonary process.     This report was finalized on 10/03/2021 10:53 by Dr. Jim Bowie MD.    CT CEREBRAL PERFUSION WITH & WITHOUT CONTRAST [[id number]] Collected: 10/02/21 1757     Updated: 10/02/21 1805    Narrative:      Indication: Indication: Neuro deficit     Exam:      1. Perfusion CT is performed to acquire images tracking the temporal  course of iodinated contrast material passing through the cerebral  circulation. Perfusion parameters, such as cerebral blood flow (CBF),  cerebral blood volume (CBV), mean transit time (MTT), etc. are  calculated by [employer] with additional provided perfusion maps and  estimated stroke volumes.  2. Automated exposure control (AEC) protocols are utilized on the  scanner to ensure dose lowered technique.      Comparison: Noncontrast CT brain and brain angiogram dated 10/2/2021  5:10 PM CDT     Findings:           Distribution: Perfusion abnormality  involves the left posterior parietal  regionin the posterior left middle cerebral artery vascular  distribution.     Tmax >6.0 seconds volume: 27 ml     CBF < 30% volume: 0 ml     Mismatch volume: 27 ml      Mismatch ratio: Independent       Impression:      Left posterior parietal perfusion mismatch     This report was finalized on 10/02/2021 18:02 by Dr. Leo Galindo MD.    CT Angiogram Neck [[id number]] Collected: 10/02/21 1731     Updated: 10/02/21 1737    Narrative:      EXAMINATION:   CT ANGIOGRAM NECK-  10/2/2021 5:31 PM CDT     HISTORY: CT ANGIOGRAM OF THE NECK 10/2/2021 5:08 PM CDT     HISTORY: Stroke, follow up     COMPARISON: None      DLP: 147 mGy cm     In order to have a CT radiation dose as low as reasonably achievable,  Automated Exposure Control was utilized for adjustment of the mA and/or  KV according to patient size.     TECHNIQUE: Following the uneventful administration of Isovue contrast,  serial helical tomographic images of the neck were obtained following  angiogram protocol. Multiplanar MIP and 3D reconstructions were provided  for review. Image degradation is present due to patient motion     FINDINGS:      Angiogram:   The aortic arch and visualized great vessels are patent, without  evidence of aneurysm, dissection, vessel cutoff, or flow limiting  stenosis.      The common carotid, internal carotid and external carotid arteries are  patent bilaterally, without evidence of aneurysm, dissection, vessel  cutoff, or flow-limiting stenosis.      Posteriorly, the vertebral arteries and visualized basilar artery  demonstrate no aneurysm, dissection, vessel cutoff, or flow-limiting  stenosis.      Other findings: The airway is patent. There is no mass or significant  lymphadenopathy. The lung apices are clear. Degenerative spondylosis is  noted in the cervical spine with no acute fracture.. The visualized  paranasal sinuses, mastoid air cells and middle ear cavities are clear.        Impression:      Impression:   1. NASCET criteria were utilized to evaluate stenoses.   2. Limited due to motion but negative CT angiogram of the neck.     This report was finalized on 10/02/2021 17:34 by Dr. Leo Galindo MD.    CT Angiogram Head w AI Analysis of LVO [[id number]] Collected: 10/02/21 1724     Updated: 10/02/21 1729    Narrative:      EXAMINATION:   CT ANGIOGRAM HEAD W AI ANALYSIS OF LVO-  10/2/2021 5:24  PM CDT     HISTORY: CT ANGIOGRAM HEAD W AI ANALYSIS OF LVO- 10/2/2021 5:08 PM CDT     HISTORY: Cerebral hemorrhage suspected     COMPARISON: None      DOSE LENGTH PRODUCT: 147 mGy cm. Automated exposure control was also  utilized to decrease patient radiation dose.     TECHNIQUE: Following the uneventful administration of Isovue contrast,  serial helical tomographic images of the brain were obtained following  angiogram protocol. Multiplanar MIP and 3D reconstructions were also  obtained.      FINDINGS:      Angiogram:   The visualized extracranial carotid vasculature is patent bilaterally,  without evidence of aneurysm, dissection, vessel cutoff, or  flow-limiting stenosis. Bilateral intracranial portions of the ICA's  demonstrate no evidence of aneurysm, dissection, vessel cutoff, or  flow-limiting stenosis.      Posteriorly, the visualized vertebral arteries and basilar arteries  demonstrate no aneurysm, dissection, vessel cutoff, or flow-limiting  stenosis.      .      Other findings:  The osseous calvarium is intact. The surrounding soft tissues are  unremarkable. The paranasal sinuses and bilateral mastoid air cells are  clear. The imaged portions of the bilateral globes and orbits are  unremarkable.        Impression:      Impression:   1. Negative CT angiogram at the level of the [place].         This report was finalized on 10/02/2021 17:26 by Dr. Leo Galindo MD.    CT Head Without Contrast Stroke Protocol [[id number]] Collected: 10/02/21 1712     Updated: 10/02/21 1719     Narrative:      EXAMINATION:   CT HEAD WO CONTRAST STROKE PROTOCOL-  10/2/2021 5:12 PM  CDT     HISTORY: CT BRAIN without contrast dated 10/2/2021 5:03 PM CDT     HISTORY: Stroke, code stroke     COMPARISON: None      DOSE LENGTH PRODUCT: 5.5 low mGy cm     In order to have a CT radiation dose as low as reasonably achievable,  Automated Exposure Control was utilized for adjustment of the mA and/or  KV according to patient size.     TECHNIQUE: Serial axial tomographic images of the brain were obtained  without the use of intravenous contrast.      FINDINGS:   The midline structures are nondisplaced. The ventricles and basilar  cisterns are normal in size and configuration. There is no evidence of  intracranial hemorrhage or mass-effect. The gray-white matter  differentiation is maintained. The sulci have a normal configuration,  and there are no abnormal extra-axial fluid collections. The structures  of the posterior fossa are unremarkable.      The included orbits and their contents are unremarkable. The visualized  paranasal sinuses, mastoid air cells and middle ear cavities are clear.  The visualized osseous structures and overlying soft tissues of the  skull and face are unremarkable.        Impression:      1. No acute intracranial process.     These results were called to Dr. Reji do in the ER at 5:15 PM        This report was finalized on 10/02/2021 17:16 by Dr. Leo Galindo MD.        TTE:    · Left ventricular ejection fraction appears to be 51 - 55%. Left ventricular systolic function is normal.  · Left ventricular diastolic function was normal.  · No evidence of a patent foramen ovale. No evidence of an atrial septal defect present. Saline test results are negative for right to left atrial level shunt.  · No evidence of pulmonary hypertension is present.  · No hemodynamically significant valvular disease.    Hospital Course  Patient is a 70 y.o. female presented with an acute onset of aphasia  and right-sided discoordination.  The patient did present as a code stroke.  tPA was administered.  CT perfusion did show an area of risk in the left parietal-occipital junction.  She was discovered to be on estrogen therapy for over 30 years and this was discontinued.  Cardiac echo was unremarkable.  CT angiography of the head and neck was unremarkable.  She showed evidence of mild hyperlipidemia with an LDL of 125.  Thyroid panel was unremarkable.  She worked with physical, occupational, and speech therapy.  Repeat head CT at 24 hours is unremarkable.  She was discharged home today on aspirin 325 and atorvastatin 80 mg daily.  Her estrogen therapy was stopped.  She will follow-up with neurology in approximately 3 weeks time and her primary care physician in approximately 7 to 10 days.  Driving restriction is recommended as her discharge examination shows evidence of a right homonymous hemianopsia in addition to some right upper extremity discoordination.  She will have outpatient ambulatory referral to physical therapy and speech therapy.      Discharge Disposition  Home or Self Care    Discharge Medications     Discharge Medications      New Medications      Instructions Start Date   aspirin 325 MG tablet   325 mg, Oral, Daily   Start Date: October 5, 2021     atorvastatin 80 MG tablet  Commonly known as: LIPITOR   80 mg, Oral, Nightly         Continue These Medications      Instructions Start Date   fish oil 1000 MG capsule capsule   Oral, Daily With Breakfast      glucosamine-chondroitin 500-400 MG capsule capsule   1 capsule, Oral, 3 Times Daily With Meals      multivitamin with minerals tablet tablet   1 tablet, Oral, Daily         Stop These Medications    estrogens (conjugated) 0.3 MG tablet  Commonly known as: PREMARIN            Discharge Diet: Regular    Activity at Discharge:   No driving  Follow-up Appointments  No future appointments.   Follow-up in 3 weeks with neurology outpatient  Follow-up with  primary care physician, Dr. Urban Felix MD in 7 to 10 days  Additional Instructions for the Follow-ups that You Need to Schedule     Ambulatory Referral to Physical Therapy Evaluate and treat, Neuro   As directed      Specialty needed: Evaluate and treat Neuro         Ambulatory Referral to Speech Therapy   As directed            Test Results Pending at Discharge  None     Mingo Monge MD  10/04/21  09:23 CDT    Time: Discharge 40 min

## 2021-10-04 NOTE — PAYOR COMM NOTE
"Prachi Do (70 y.o. Female) [id number]   New admit 10/2   CCU      [facility] phone     Fax        Date of Birth Social Security Number Address Home Phone MRN    1950  [address] [place] [id number]  [id number]    Anglican Marital Status          Roman Catholic        Admission Date Admission Type Admitting Provider Attending Provider Department, Room/Bed    10/2/21 Emergency Madelyn Dove MD Braun Hashemi, Clare A, MD [facility] CARDIAC CARE, C007/1    Discharge Date Discharge Disposition Discharge Destination                       Attending Provider: Madelyn Dove MD    Allergies: Gadolinium Derivatives    Isolation: None   Infection: None   Code Status: CPR    Ht: 165.1 cm (65\")   Wt: 60.7 kg (133 lb 13.1 oz)    Admission Cmt: None   Principal Problem: None                Active Insurance as of 10/2/2021     Primary Coverage     Payor Plan Insurance Group Employer/Plan Group    [facility] MEDICARE REPLACEMENT [facility] MEDICARE REPLACEMENT [id number]     Payor Plan Address Payor Plan Phone Number Payor Plan Fax Number Effective Dates    [address] [address]   1/1/2018 - None Entered    [facility] [id number]       Subscriber Name Subscriber Birth Date Member ID       PRACHI DO 1950 [id number]                 Emergency Contacts      (Rel.) Home Phone Work Phone Mobile Phone    Gadiel Do (Spouse) -- -- [phone]               History & Physical      Madelyn Dove MD at 10/02/21 1758              Neurology History & Physical    Prachi Do  1950  [id number]      10/2/2021    Indication for Admission: Acute stroke    History of present illness:    This is a 70 y.o. right handed female who is admitted for acute aphasia and stroke    Patient last known well was 3 PM but  found her at 4:15 PM and she was not able to speak and had difficulty walking  She " does not smoke, or have DM, Hypertension or hyperlipidemia but she is on estrogen    Head CT was unremarkable  CTA of head and neck  Perfusion scan showed area of risk  No past medical history on file.  Past Surgical History:   Procedure Laterality Date   • HYSTERECTOMY     • OOPHORECTOMY         Prior to Admission medications    Medication Sig Start Date End Date Taking? Authorizing Provider   estrogens, conjugated, (PREMARIN) 0.3 MG tablet Take 0.3 mg by mouth Daily. Take daily for 21 days then do not take for 7 days.   Yes Rene Ramires MD   glucosamine-chondroitin 500-400 MG capsule capsule Take 1 capsule by mouth 3 (Three) Times a Day With Meals.   Yes Rene Ramires MD   multivitamin with minerals tablet tablet Take 1 tablet by mouth Daily.   Yes Rene Ramires MD   Omega-3 Fatty Acids (fish oil) 1000 MG capsule capsule Take  by mouth Daily With Breakfast.   Yes Rene Ramires MD       Hospital scheduled medications:      Hospital PRN medications:  •  sodium chloride    Allergies   Allergen Reactions   • Gadolinium Derivatives Anaphylaxis     Cardiac arrest per .     (Not in a hospital admission)      Social History     Socioeconomic History   • Marital status:      Spouse name: Not on file   • Number of children: Not on file   • Years of education: Not on file   • Highest education level: Not on file     Family History   Problem Relation Age of Onset   • Breast cancer Maternal Aunt    • No Known Problems Mother    • No Known Problems Father    • No Known Problems Sister    • No Known Problems Brother    • No Known Problems Daughter    • No Known Problems Son    • No Known Problems Maternal Grandmother    • No Known Problems Paternal Grandmother    • No Known Problems Paternal Aunt    • No Known Problems Other    • BRCA 1/2 Neg Hx    • Colon cancer Neg Hx    • Endometrial cancer Neg Hx    • Ovarian cancer Neg Hx        Review of Systems  A 14 point review of systems  was unobtainable due to her aphasia  Vital Signs   Temp:  [98 °F (36.7 °C)] 98 °F (36.7 °C)  Heart Rate:  [103-105] 105  Resp:  [20] 20  BP: (167-182)/(82-86) 182/86    General Exam:  Head:  Normal cephalic, atraumatic  HEENT:  Neck supple  Fundoscopic Exam:  No signs of disc edema  CVS:  Regular rate and rhythm.  No murmurs  Carotid Examination:  No bruits  Lungs:  Clear to auscultation  Abdomen:  Non-tender, Non-distended  Extremities:  No signs of peripheral edema  Skin:  No rashes    Neurologic Exam:    Mental Status:    -Awake, Alert, Very anxious Repeating syllables as she is trying to speak  Will follow some commands partially but not fully comprehending what to do. She is having  difficulty getting words out and will repeat the words given to her sometimes  Unable to name items, read sentences and does not repeat when asked to repeat words. Could not describe picture  Unable-Follows simple and complex commands    CN II:  Visual fields full.  Pupils equally reactive to light  CN III, IV, VI:  Extraocular Muscles full with no signs of nystagmus  CN V:  Facial sensory is symmetric with no asymmetries.  CN VII:  Facial motor symmetric  CN VIII:  Gross hearing intact bilaterally  CN IX/X:  Palate elevates symmetrically  CN XI:  Shoulder shrug symmetric  CN XII:  Tongue is midline on protrusion    Motor: (strength out of 5:  1= minimal movement, 2 = movement in plane of gravity, 3 = movement against gravity, 4 = movement against some resistance, 5 = full strength)  She moves all 4 extremities well     DTR:  -Right   Bicep: 2+ Triceps: 2+ Brachioradialis: 2+   Patella: 2+ Ankle: 2+ Neg Babinski  -Left   Bicep: 2+ Triceps: 2+ Brachioradialis: 2+   Patella: 2+ Ankle: 2+ Neg Babinski    Sensory:  -Unable to assess  light touch, pinprick, temperature, pain, and proprioception due to aphasia    Coordination:  -Finger to nose/Heel to shin  --she could not comprehend command-No ataxia    Gait  -Not tested due to code  stroke      Results Review:  Lab Results (last 7 days)     Procedure Component Value Units Date/Time    COVID PRE-OP / PRE-PROCEDURE SCREENING ORDER (NO ISOLATION) - Swab, Nasal Cavity [[id number]] Collected: 10/02/21 1741    Specimen: Swab from Nasal Cavity Updated: 10/02/21 1749    Narrative:      The following orders were created for panel order COVID PRE-OP / PRE-PROCEDURE SCREENING ORDER (NO ISOLATION) - Swab, Nasal Cavity.  Procedure                               Abnormality         Status                     ---------                               -----------         ------                     COVID-19,[facility] IN-LIN...[[id number]]                      In process                   Please view results for these tests on the individual orders.    COVID-19,[facility] IN-HOUSE,Nasal Swab No Transport Media 3-4 HR TAT - Swab, Nasal Cavity [[id number]] Collected: 10/02/21 1741    Specimen: Swab from Nasal Cavity Updated: 10/02/21 1749    Comprehensive Metabolic Panel [[id number]]  (Abnormal) Collected: 10/02/21 1701    Specimen: Blood Updated: 10/02/21 1738     Glucose 97 mg/dL      BUN 16 mg/dL      Creatinine 0.72 mg/dL      Sodium 138 mmol/L      Potassium 3.5 mmol/L      Chloride 99 mmol/L      CO2 22.0 mmol/L      Calcium 9.4 mg/dL      Total Protein 7.6 g/dL      Albumin 4.60 g/dL      ALT (SGPT) 10 U/L      AST (SGOT) 18 U/L      Alkaline Phosphatase 89 U/L      Total Bilirubin 0.5 mg/dL      eGFR Non African Amer 81 mL/min/1.73      Globulin 3.0 gm/dL      A/G Ratio 1.5 g/dL      BUN/Creatinine Ratio 22.2     Anion Gap 17.0 mmol/L     Narrative:      GFR Normal >60  Chronic Kidney Disease <60  Kidney Failure <15      Troponin [[id number]]  (Normal) Collected: 10/02/21 1701    Specimen: Blood Updated: 10/02/21 1735     Troponin T <0.010 ng/mL     Narrative:      Troponin T Reference Range:  <= 0.03 ng/mL-   Negative for AMI  >0.03 ng/mL-     Abnormal for myocardial necrosis.  Clinicians would have to utilize  clinical acumen, EKG, Troponin and serial changes to determine if it is an Acute Myocardial Infarction or myocardial injury due to an underlying chronic condition.       Results may be falsely decreased if patient taking Biotin.      Protime-INR [[id number]]  (Abnormal) Collected: 10/02/21 1701    Specimen: Blood Updated: 10/02/21 1729     Protime 11.6 Seconds      INR 0.88    aPTT [[id number]]  (Normal) Collected: 10/02/21 1701    Specimen: Blood Updated: 10/02/21 1729     PTT 26.4 seconds     CBC & Differential [[id number]]  (Normal) Collected: 10/02/21 1701    Specimen: Blood Updated: 10/02/21 1719    Narrative:      The following orders were created for panel order CBC & Differential.  Procedure                               Abnormality         Status                     ---------                               -----------         ------                     CBC Auto Differential[[id number]]        Normal              Final result                 Please view results for these tests on the individual orders.    CBC Auto Differential [[id number]]  (Normal) Collected: 10/02/21 1701    Specimen: Blood Updated: 10/02/21 1719     WBC 6.95 10*3/mm3      RBC 3.90 10*6/mm3      Hemoglobin 12.8 g/dL      Hematocrit 37.4 %      MCV 95.9 fL      MCH 32.8 pg      MCHC 34.2 g/dL      RDW 12.7 %      RDW-SD 44.0 fl      MPV 10.4 fL      Platelets 343 10*3/mm3      Neutrophil % 49.6 %      Lymphocyte % 42.3 %      Monocyte % 5.5 %      Eosinophil % 1.9 %      Basophil % 0.4 %      Immature Grans % 0.3 %      Neutrophils, Absolute 3.45 10*3/mm3      Lymphocytes, Absolute 2.94 10*3/mm3      Monocytes, Absolute 0.38 10*3/mm3      Eosinophils, Absolute 0.13 10*3/mm3      Basophils, Absolute 0.03 10*3/mm3      Immature Grans, Absolute 0.02 10*3/mm3      nRBC 0.0 /100 WBC     Red Top [[id number]] Collected: 10/02/21 1701    Specimen: Blood Updated: 10/02/21 1715    Light Blue Top [[id number]] Collected: 10/02/21 1701    Specimen: Blood  Updated: 10/02/21 1715    Green Top (Gel) [[id number]] Collected: 10/02/21 1701    Specimen: Blood Updated: 10/02/21 1715    [place] Draw [[id number]] Collected: 10/02/21 1701    Specimen: Blood Updated: 10/02/21 1715    Narrative:      The following orders were created for panel order [place] Draw.  Procedure                               Abnormality         Status                     ---------                               -----------         ------                     Green Top (Gel)[[id number]]                                  In process                 L[place]ender Top[[id number]]                                     In process                 Red Top[[id number]]                                          In process                 Light Blue Top[[id number]]                                   In process                   Please view results for these tests on the individual orders.    Lavender Top [[id number]] Collected: 10/02/21 1701    Specimen: Blood Updated: 10/02/21 1715    POC Glucose Once [[id number]]  (Abnormal) Collected: 10/02/21 1700    Specimen: Blood Updated: 10/02/21 1712     Glucose 61 mg/dL      Comment: : [id number] [id number]             Head CT personally reviewed and was unremarkable for acute stroke  CTA:  CT perfusion shows the area of risk as left MCA with out area of definite infarct.:    There is no problem list on file for this patient.          Impression:    1.Acute left MCA ischemia presenting with aphasia  2. Patient on estrogen and this is her only known risk factor for stroke  3. TPA     There was a delay in TPA due to delay in notification of stroke team  Plan:    TPA STAT  Admit to ICU  Cardiac monitoring  Echo with bubble  D/C estrogen  Lipid profile  Hemoglobin A1C  Speech therapy  PT/OT  Lipitor    ADDENDUM: Patient began speaking in a full sentence shortly after bolus of TPA.  Still some speech hesitancy but markedly better.     Madelyn Virk,  "MD  10/02/21  17:58 CDT      Electronically signed by Madelyn Dove MD at 10/03/21 1718          Emergency Department Notes      Mike Dorman MD at 10/02/21 1704      Procedure Orders    1. Critical Care [[id number]] ordered by Mike Dorman MD               Subjective   70-year-old female presents to the emergency department with concern for stroke.  She was last known well at 3 PM, approximately 2 hours prior to arrival.  Patient was found abnormal at 4:15 PM.  The patient states he was outside when he heard his wife call his name she had significant expressive aphasia and seemed extremely anxious.  She was unable to speak clearly, had a stuttering speech when she attempted to speak, and would repeat some phrases.  Patient with difficulty standing as well, he states that she was attempting to come down the stairs and had to slide on her bottom.  On arrival to the ER, patient just repeating the word \"no\" to almost every question.  She can not consistently follow simple commands.    Family gave the patient 325 mg aspirin around 4:15 PM.  Not able to obtain any additional information.      History provided by:  Spouse  History limited by:  Patient nonverbal      Review of Systems   Unable to perform ROS: Patient nonverbal       History reviewed. No pertinent past medical history.    Allergies   Allergen Reactions   • Gadolinium Derivatives Anaphylaxis     Cardiac arrest per .       Past Surgical History:   Procedure Laterality Date   • HYSTERECTOMY     • OOPHORECTOMY         Family History   Problem Relation Age of Onset   • Breast cancer Maternal Aunt    • No Known Problems Mother    • No Known Problems Father    • No Known Problems Sister    • No Known Problems Brother    • No Known Problems Daughter    • No Known Problems Son    • No Known Problems Maternal Grandmother    • No Known Problems Paternal Grandmother    • No Known Problems Paternal Aunt    • No Known Problems Other  "   • BRCA 1/2 Neg Hx    • Colon cancer Neg Hx    • Endometrial cancer Neg Hx    • Ovarian cancer Neg Hx        Social History     Socioeconomic History   • Marital status:      Spouse name: Not on file   • Number of children: Not on file   • Years of education: Not on file   • Highest education level: Not on file   Tobacco Use   • Smoking status: Never Smoker   Substance and Sexual Activity   • Alcohol use: Never   • Drug use: Never           Objective   Physical Exam  Vitals and nursing note reviewed.   Constitutional:       Appearance: She is ill-appearing.      Comments: Well-developed well-nourished elderly female who appears to be extremely scared, obvious expressive aphasia   HENT:      Head: Normocephalic and atraumatic.      Nose: Nose normal. No congestion or rhinorrhea.      Mouth/Throat:      Mouth: Mucous membranes are moist.      Pharynx: Oropharynx is clear. No oropharyngeal exudate or posterior oropharyngeal erythema.   Eyes:      General:         Right eye: No discharge.         Left eye: No discharge.      Extraocular Movements: Extraocular movements intact.      Pupils: Pupils are equal, round, and reactive to light.   Cardiovascular:      Rate and Rhythm: Normal rate and regular rhythm.      Pulses: Normal pulses.      Heart sounds: Normal heart sounds. No murmur heard.   No friction rub.   Pulmonary:      Effort: Pulmonary effort is normal.      Breath sounds: Normal breath sounds. No wheezing, rhonchi or rales.   Abdominal:      General: Abdomen is flat. Bowel sounds are normal. There is no distension.      Palpations: Abdomen is soft.      Tenderness: There is no abdominal tenderness.   Musculoskeletal:         General: No swelling or tenderness. Normal range of motion.      Cervical back: Normal range of motion and neck supple. No tenderness.   Skin:     General: Skin is warm and dry.      Capillary Refill: Capillary refill takes less than 2 seconds.   Neurological:      Mental Status:  She is alert. She is disoriented.      Cranial Nerves: No cranial nerve deficit.      Sensory: Sensory deficit present.      Motor: No weakness.      Comments: Difficult to assess due to both expressive and receptive aphasia  No obvious focal weakness, questionable decrease sensation right upper extremity.  She is moving all extremities appears to have symmetric  strength  Patient is alert but appears disoriented  No gross focal cranial nerve deficits  Unable to follow commands to assess finger-to-nose and heel-to-shin     Psychiatric:      Comments: Anxious mood         Critical Care  Performed by: Mike Dorman MD  Authorized by: Madelyn Dove MD     Critical care provider statement:     Critical care time (minutes):  45    Critical care time was exclusive of:  Separately billable procedures and treating other patients and teaching time    Critical care was necessary to treat or prevent imminent or life-threatening deterioration of the following conditions:  CNS failure or compromise (Ischemic stroke requiring TPA)    Critical care was time spent personally by me on the following activities:  Ordering and performing treatments and interventions, re-evaluation of patient's condition, ordering and review of laboratory studies, ordering and review of radiographic studies, pulse oximetry, obtaining history from patient or surrogate, examination of patient, evaluation of patient's response to treatment and discussions with consultants              ED Course  ED Course as of Oct 02 2244   Sat Oct 02, 2021   1716 70-year-old healthy female presents to the emergency department with a sudden onset of speech abnormality and unsteady gait.  Last known well was 3 PM, was found to be abnormal by  around 4:15 PM when he noted her being unable to speak and having difficulty walking.  Family administered at 325 mg of aspirin prior to arrival.    Arrival to the emergency department patient was visibly  scared and anxious.  She had expressive aphasia and did have some difficulty following commands.   NIH stroke scale on arrival was 6  Code stroke was initiated and patient was taken emergently to CT suite for stroke alert imaging.    She is within 3 hr window for TPA.    [AW]   1740 Spoke to Dr Vieyra, she is en route to ED.   Will discuss risks and benefits of TPA with  and review for any possible contraindications, go ahead and have nurses set up for TPA administration    [AW]   1840 Patient received TPA in the emergency department, will admit to the ICU to the service of Dr. Selwyn Virk    [AW]      ED Course User Index  [AW] Mike Dorman MD                                           MDM  Number of Diagnoses or Management Options  Cerebrovascular accident (CVA), unspecified mechanism (HCC): new and requires workup     Amount and/or Complexity of Data Reviewed  Clinical lab tests: reviewed  Tests in the radiology section of CPT®: reviewed    Risk of Complications, Morbidity, and/or Mortality  Presenting problems: high  Diagnostic procedures: high  Management options: high        Final diagnoses:   Cerebrovascular accident (CVA), unspecified mechanism (HCC)       ED Disposition  ED Disposition     ED Disposition Condition Comment    Decision to Admit  Level of Care: Critical Care [6]   Diagnosis: Stroke aborted by administration of thrombolytic agent (HCC) [[id number]]   Isolate for COVID?: No [0]   Certification: I Certify That Inpatient Hospital Services Are Medically Necessary For Greater Than 2 Midnights            No follow-up provider specified.       Medication List      No changes were made to your prescriptions during this visit.          Mike Dorman MD  10/02/21 224      Electronically signed by Mike Dorman MD at 10/02/21 5219     Rivka Flood at 10/02/21 1727        17:03 pm Code stroke called...17:25 pm called Dr.Braun Virk for  due her not  "calling back on the code...17:28 p.m. Dr.Braun Virk returned the call     Rivka Flood  10/02/21 1728       Rivka Flood  10/02/21 1730      Electronically signed by Rivka Flood at 10/02/21 1730     Ramona Rey RN at 10/02/21 1742        Inquired about starting TPA for patient after Dr. Virk asked PA (Sky Vincent) to finish neuro exam.  Dr. Virk stated to this RN, \"Not yet, let me review the chart.\"  Dr. Virk stated to pt and family she would return to discuss TPA.      Ramona Rey RN  10/02/21 1903      Electronically signed by Ramona Rey RN at 10/02/21 1903     Ramona Rey RN at 10/02/21 1810        Dr. Dorman to bs for reevaluation.  Asked Lakia if TPA can be started.  He left room to discuss with Dr. Virk and returned to BS and instructed this RN to start tpa.  Doses calculated with Dr. Dorman and Charge nurse, Rosanna. TPA bolus started at 1811 by this RN.     Ramona Rey RN  10/02/21 1904      Electronically signed by Ramona Rey RN at 10/02/21 1904       "

## 2021-10-04 NOTE — THERAPY EVALUATION
Patient Name: Jesika Bowers  : 1950    MRN: [id number]                              Today's Date: 10/4/2021       Admit Date: 10/2/2021    Visit Dx:     ICD-10-CM ICD-9-CM   1. Cerebrovascular accident (CVA), unspecified mechanism (HCC)  I63.9 434.91   2. Dysphagia, unspecified type  R13.10 787.20   3. Acute ischemic left MCA stroke (HCC)  I63.512 434.91   4. Impaired mobility and ADLs  Z74.09 V49.89    Z78.9    5. Impaired functional mobility and activity tolerance  Z74.09 V49.89     Patient Active Problem List   Diagnosis   • Acute ischemic left MCA stroke (HCC)   • Received intravenous tissue plasminogen activator (tPA) in emergency department   • Hyperlipidemia LDL goal <70     History reviewed. No pertinent past medical history.  Past Surgical History:   Procedure Laterality Date   • HYSTERECTOMY     • OOPHORECTOMY       General Information     Row Name 10/04/21 0811          Physical Therapy Time and Intention    Document Type  evaluation;other (see comments) see MAR  -AMBER     Mode of Treatment  physical therapy  -     Row Name 10/04/21 0811          General Information    Patient Profile Reviewed  yes  -JE     Prior Level of Function  independent:;all household mobility;community mobility;ADL's;home management;cooking;cleaning;driving;shopping;using stairs;work;yard work  -AMBER     Existing Precautions/Restrictions  fall  -JE     Barriers to Rehab  physical barrier  -     Row Name 10/04/21 0811          Living Environment    Lives With  spouse  -     Row Name 10/04/21 0811          Home Main Entrance    Number of Stairs, Main Entrance  four  -JE     Stair Railings, Main Entrance  none  -     Row Name 10/04/21 0811          Stairs Within Home, Primary    Stairs, Within Home, Primary  to 2nd level and basement  -AMBER     Number of Stairs, Within Home, Primary  other (see comments) full flight; reports she doesn't have to go upstairs if needed  -     Row Name 10/04/21 0811          Cognition     Orientation Status (Cognition)  oriented x 4  -     Row Name 10/04/21 0811          Safety Issues, Functional Mobility    Safety Issues Affecting Function (Mobility)  ability to follow commands;safety precaution awareness  -     Impairments Affecting Function (Mobility)  motor planning;strength;visual/perceptual  -     Row Name 10/04/21 0811          Relationship/Environment    Name(s) of Who Lives With Patient  lives w/ spouse  -       User Key  (r) = Recorded By, (t) = Taken By, (c) = Cosigned By    Initials Name Provider Type    Renetta Crawford, PT Physical Therapist        Mobility     Row Name 10/04/21 0811          Bed Mobility    Bed Mobility  supine-sit  -     Supine-Sit [place] (Bed Mobility)  modified independence  -     Assistive Device (Bed Mobility)  head of bed elevated  -     Row Name 10/04/21 0811          Sit-Stand Transfer    Sit-Stand [place] (Transfers)  standby assist  -     Row Name 10/04/21 0811          Gait/Stairs (Locomotion)    [place] Level (Gait)  contact guard  -     Distance in Feet (Gait)  200 ft  -     Comment (Gait/Stairs)  performed gait on toes, heels, backward gait all w/out difficulty; dual task while walking w/ noted slowed gait pace, however no LOB  -       User Key  (r) = Recorded By, (t) = Taken By, (c) = Cosigned By    Initials Name Provider Type    Renetta Crawford, PT Physical Therapist        Obj/Interventions     Row Name 10/04/21 0811          Range of Motion Comprehensive    General Range of Motion  no range of motion deficits identified  -     Row Name 10/04/21 0811          Strength Comprehensive (MMT)    Comment, General Manual Muscle Testing (MMT) Assessment  defer to OT for formal UE strength assessment; B LEs grossly 4+ to 5/5  -     Row Name 10/04/21 0811          Motor Skills    Motor Skills  other (see comments) finger opposition, LORIE, FTN, heel to shin WFLs w/ VCs, demonstration and nonverbal cues  -[facility]  Name 10/04/21 0811          Balance    Balance Assessment  sitting static balance;sitting dynamic balance;standing static balance;standing dynamic balance  -     Static Sitting Balance  WNL;supported;unsupported;sitting, edge of bed;sitting in chair  -     Dynamic Sitting Balance  WFL;supported;unsupported;sitting, edge of bed  -     Static Standing Balance  WFL;unsupported;standing  -     Dynamic Standing Balance  WFL;unsupported;standing  -     Comment, Balance  sternal nudges X 3 w/out LOB, 360 degree turn and reaching for targets w/out LOB  -     Row Name 10/04/21 0811          Sensory Assessment (Somatosensory)    Sensory Assessment (Somatosensory)  other (see comments) reports intact to lt touch, however decrease in R UE and R upper leg compared to L  -JE       User Key  (r) = Recorded By, (t) = Taken By, (c) = Cosigned By    Initials Name Provider Type    Renetta Crawford, PT Physical Therapist        Goals/Plan     Row Name 10/04/21 0811          Gait Training Goal 1 (PT)    Activity/Assistive Device (Gait Training Goal 1, PT)  gait (walking locomotion);decrease fall risk;improve balance and speed  -     [place] Level (Gait Training Goal 1, PT)  independent  -     Distance (Gait Training Goal 1, PT)  400+ ft  -JE     Time Frame (Gait Training Goal 1, PT)  long term goal (LTG);10 days  -JE     Progress/Outcome (Gait Training Goal 1, PT)  goal ongoing  -     Row Name 10/04/21 0811          Stairs Goal 1 (PT)    Activity/Assistive Device (Stairs Goal 1, PT)  ascending stairs;descending stairs;using handrail, left;using handrail, right;step-over step;decrease fall risk;improve balance and speed  -     [place] Level/Cues Needed (Stairs Goal 1, PT)  standby assist  -     Number of Stairs (Stairs Goal 1, PT)  8+  -JE     Time Frame (Stairs Goal 1, PT)  10 days  -JE     Progress/Outcome (Stairs Goal 1, PT)  goal ongoing  -JE       User Key  (r) = Recorded By, (t) = Taken By, (c) =  "Cosigned By    Initials Name Provider Type    Renetta Crawford, PT Physical Therapist        Clinical Impression     Row Name 10/04/21 0811          Pain    Additional Documentation  Pain Scale: Numbers Pre/Post-Treatment (Group)  -AMBER     Row Name 10/04/21 0811          Pain Scale: Numbers Pre/Post-Treatment    Pretreatment Pain Rating  0/10 - no pain  -AMBER     Posttreatment Pain Rating  0/10 - no pain  -AMBER     Row Name 10/04/21 0811          Plan of Care Review    Plan of Care Reviewed With  patient  -     Outcome Summary  PT eval completed.  Pt highly motivated and pleasant, agreeable to therapy.  Pt reports she feels like she has improved, but still continues w/ some \"quirks.\"  Pt reports her biggest c/os is the R hand weakness.  Pt w/ decrease sensation in the R UE and R upper leg compared to the L side and R lower leg.  Pt demonstrated difficulty w/ motor planning requiring verbal cues, demonstration, and non verbal cues to complete activities accurately that were new or not routine for the pt.   However, pt able to do daily routine activities I w/out any difficulty, for example tying her shoes.  Pt also now w/ R peripheral vision deficits.  Pt education for safety/falls prevention w/ emphasis on increase awareness of environment.  Pt w/ good balance in sitting and standing, however due to visual field deficit fall risk increases.  PT to continue for continued education for safety/falls prevention and to further assess gait ability w/ emphasis on steps.  Anticipate pt will return home w/ assist at discharge.  Recommend continued OP therapy w/ emphasis on OT services when discharged.  -AMBER     Row Name 10/04/21 0811          Therapy Assessment/Plan (PT)    Patient/Family Therapy Goals Statement (PT)  return home, improve deficit areas  -     Criteria for Skilled Interventions Met (PT)  yes;skilled treatment is necessary  -     Predicted Duration of Therapy Intervention (PT)  until discharge or goals " achieved  -JE     Row Name 10/04/21 0811          Vital Signs    Pre Systolic BP Rehab  145  -JE     Pre Treatment Diastolic BP  85  -JE     Pretreatment Heart Rate (beats/min)  83  -JE     Posttreatment Heart Rate (beats/min)  80  -JE     Pretreatment Resp Rate (breaths/min)  15  -JE     Posttreatment Resp Rate (breaths/min)  18  -JE     O2 Delivery Pre Treatment  room air  -JE     O2 Delivery Intra Treatment  room air  -JE     O2 Delivery Post Treatment  room air  -JE     Pre Patient Position  Supine  -JE     Intra Patient Position  Standing  -JE     Post Patient Position  Sitting  -JE     Row Name 10/04/21 0811          Positioning and Restraints    Pre-Treatment Position  in bed  -JE     Post Treatment Position  chair  -JE     In Chair  call light within reach;sitting;notified nsg;encouraged to call for assist;patient within staff view  -JE       User Key  (r) = Recorded By, (t) = Taken By, (c) = Cosigned By    Initials Name Provider Type    Renetta Crawford, PT Physical Therapist        Outcome Measures     Row Name 10/04/21 0811          How much help from another person do you currently need...    Turning from your back to your side while in flat bed without using bedrails?  4  -JE     Moving from lying on back to sitting on the side of a flat bed without bedrails?  4  -JE     Moving to and from a bed to a chair (including a wheelchair)?  3  -JE     Standing up from a chair using your arms (e.g., wheelchair, bedside chair)?  4  -JE     Climbing 3-5 steps with a railing?  3  -JE     To walk in hospital room?  3  -JE     AM-PAC 6 Clicks Score (PT)  21  -     Row Name 10/04/21 0811          Modified [place] Scale    Pre-Stroke Modified [place] Scale  0 - No Symptoms at all.  -JE     Modified [place] Scale  2 - Slight disability.  Unable to carry out all previous activities but able to look after own affairs without assistance.  -     Row Name 10/04/21 0900 10/04/21 0820       Functional Assessment    Outcome  "Measure Options  AM-PAC 6 Clicks Daily Activity (OT)  -J  AM-PAC 6 Clicks Daily Activity (OT)  -JJ    Row Name 10/04/21 0811          Functional Assessment    Outcome Measure Options  AM-PAC 6 Clicks Basic Mobility (PT);Modified [place]  -JE       User Key  (r) = Recorded By, (t) = Taken By, (c) = Cosigned By    Initials Name Provider Type    Renetta Crawford, PT Physical Therapist    Ary Head, OTR/L Occupational Therapist                         PT Recommendation and Plan  Planned Therapy Interventions (PT): balance training, gait training, stair training, patient/family education, other (see comments) (safety/falls prevention)  Plan of Care Reviewed With: patient  Outcome Summary: PT eval completed.  Pt highly motivated and pleasant, agreeable to therapy.  Pt reports she feels like she has improved, but still continues w/ some \"quirks.\"  Pt reports her biggest c/os is the R hand weakness.  Pt w/ decrease sensation in the R UE and R upper leg compared to the L side and R lower leg.  Pt demonstrated difficulty w/ motor planning requiring verbal cues, demonstration, and non verbal cues to complete activities accurately that were new or not routine for the pt.   However, pt able to do daily routine activities I w/out any difficulty, for example tying her shoes.  Pt also now w/ R peripheral vision deficits.  Pt education for safety/falls prevention w/ emphasis on increase awareness of environment.  Pt w/ good balance in sitting and standing, however due to visual field deficit fall risk increases.  PT to continue for continued education for safety/falls prevention and to further assess gait ability w/ emphasis on steps.  Anticipate pt will return home w/ assist at discharge.  Recommend continued OP therapy w/ emphasis on OT services when discharged.     Time Calculation:   PT Charges     Row Name 10/04/21 1424             Time Calculation    Start Time  0811  -JE      Stop Time  0924 -JE      Time " Calculation (min)  73 min  -AMBER      PT Received On  10/04/21  -AMBER      PT Goal Re-Cert Due Date  10/14/21  -AMBER        User Key  (r) = Recorded By, (t) = Taken By, (c) = Cosigned By    Initials Name Provider Type    Renetta Crawford, PT Physical Therapist        Therapy Charges for Today     Code Description Service Date Service Provider Modifiers Qty    12583156316 HC PT EVAL MOD COMPLEXITY 4 10/4/2021 Renetta Gleason, PT GP 1    80321226748 HC GAIT TRAINING EA 15 MIN 10/4/2021 Renetta Gleason, PT GP 1          PT G-Codes  Outcome Measure Options: AM-PAC 6 Clicks Daily Activity (OT)  AM-PAC 6 Clicks Score (PT): 21  AM-PAC 6 Clicks Score (OT): 18  Modified [place] Scale: 2 - Slight disability.  Unable to carry out all previous activities but able to look after own affairs without assistance.    Renetta Gleason, ARMINDA  10/4/2021

## 2021-10-04 NOTE — PLAN OF CARE
Goal Outcome Evaluation:  Plan of Care Reviewed With: patient           Outcome Summary: OT eval completed. Pt is alert and oriented x4. Pt reports improved speech this am, since initial admit on 10/2. Pt demonstrated deminished sensation in R UE, impaired motor planning, and impaired R peripheral vision. She was able to complete bed mobility, sit <> stand t/f from EOB and all mobility with SBA/CGA. She was able to track in all directions but has R sided peripheral filed deficits. GMC/FMC impaired R side, requiring verbal cues and demonstration to complete formal assessment. B UE strength 5/5. She would benefit from skilled OT services to address these deficits. Recommend d/c home with assist and OP services to address these deficits.

## 2021-10-04 NOTE — THERAPY DISCHARGE NOTE
Acute Care - Speech Language Pathology Discharge Summary  [facility]       Patient Name: Jesika Bowers  : 1950  MRN: [id number]    Today's Date: 10/4/2021                   Admit Date: 10/2/2021      SLP Recommendation and Plan  Regular with thin liquids  Lyndsay Pinzon MS CCC-SLP 10/4/2021 15:39 CDT    Visit Dx:    ICD-10-CM ICD-9-CM   1. Cerebrovascular accident (CVA), unspecified mechanism (HCC)  I63.9 434.91   2. Dysphagia, unspecified type  R13.10 787.20   3. Acute ischemic left MCA stroke (HCC)  I63.512 434.91   4. Impaired mobility and ADLs  Z74.09 V49.89    Z78.9    5. Impaired functional mobility and activity tolerance  Z74.09 V49.89               SLP GOALS     Row Name 10/04/21 1500 10/03/21 0910          Oral Nutrition/Hydration Goal 1 (SLP)    Oral Nutrition/Hydration Goal 1, SLP  Patient will tolerate LRD without s/s of aspiration.  -KW  Patient will tolerate LRD without s/s of aspiration.  -MM     Time Frame (Oral Nutrition/Hydration Goal 1, SLP)  by discharge  -KW  by discharge  -MM     Barriers (Oral Nutrition/Hydration Goal 1, SLP)  none  -KW  none  -MM     Progress/Outcomes (Oral Nutrition/Hydration Goal 1, SLP)  goal partially met  -KW  goal ongoing  -MM       User Key  (r) = Recorded By, (t) = Taken By, (c) = Cosigned By    Initials Name Provider Type    Lyndsay Hendrickson MS CCC-SLP Speech and Language Pathologist    Hanna Rousseau MS CCC-SLP Speech and Language Pathologist                  SLP Discharge Summary  Anticipated Discharge Disposition (SLP): unknown  Reason for Discharge: discharge from this facility  Progress Toward Achieving Short/long Term Goals: all goals met within established timelines  Discharge Destination: home      Lyndsay Pinzon MS CCC-SLP  10/4/2021

## 2021-10-04 NOTE — TELEPHONE ENCOUNTER
Caller: HOSPITAL DISCHARGE NURSE    What was the call regarding: CALLED TO SCHEDULE 3 WK HOSP F/U VISIT FOR PT. CALL WARM-TRANSFERRED TO THE OFFICE TO ENSURE PT IS SCHEDULED WITHIN THE SUGGEST F/U TIMEFRAME.    Call warm-transferred to: QIANA    DOCUMENTING PER HUB PROTOCOL.

## 2021-10-04 NOTE — PLAN OF CARE
Goal Outcome Evaluation:      AAOX4. NIH 3 this shift. Ambulated to BR with standby assistance. UOP adequate. Safety maintained. No c/o pain or headache.

## 2021-10-04 NOTE — DISCHARGE INSTR - APPOINTMENTS
10/11/21 at 10:15am with Dr. Coyne at [facility]  12/1/21 at 3:00pm with MEGA rCenshaw at [facility]

## 2021-10-05 ENCOUNTER — READMISSION MANAGEMENT (OUTPATIENT)
Dept: CALL CENTER | Facility: HOSPITAL | Age: 71
End: 2021-10-05

## 2021-10-05 NOTE — OUTREACH NOTE
Prep Survey      Responses   [facility] facility patient discharged from?  [place]   Is LACE score < 7 ?  Yes   Emergency Room discharge w/ pulse ox?  No   Eligibility  Readm Mgmt   Discharge diagnosis    Acute left MCA CVA   Does the patient have one of the following disease processes/diagnoses(primary or secondary)?  Stroke (TIA)   Does the patient have Home health ordered?  No   Is there a DME ordered?  No   Prep survey completed?  Yes          Razia Soler RN

## 2021-10-05 NOTE — THERAPY DISCHARGE NOTE
Acute Care - Physical Therapy Discharge Summary  [facility]       Patient Name: Jesika Bowers  : 1950  MRN: [id number]    Today's Date: 10/5/2021                 Admit Date: 10/2/2021      PT Recommendation and Plan    Visit Dx:    ICD-10-CM ICD-9-CM   1. Cerebrovascular accident (CVA), unspecified mechanism (HCC)  I63.9 434.91   2. Dysphagia, unspecified type  R13.10 787.20   3. Acute ischemic left MCA stroke (HCC)  I63.512 434.91   4. Impaired mobility and ADLs  Z74.09 V49.89    Z78.9    5. Impaired functional mobility and activity tolerance  Z74.09 V49.89       Outcome Measures     Row Name 10/04/21 0900             Functional Assessment    Outcome Measure Options  AM-PAC 6 Clicks Daily Activity (OT)  -LIAN        User Key  (r) = Recorded By, (t) = Taken By, (c) = Cosigned By    Initials Name Provider Type    Ary Head, OTR/L Occupational Therapist              PT Rehab Goals     Row Name 10/05/21 0722             Gait Training Goal 1 (PT)    Activity/Assistive Device (Gait Training Goal 1, PT)  gait (walking locomotion);decrease fall risk;improve balance and speed  -      [place] Level (Gait Training Goal 1, PT)  independent  -AH      Distance (Gait Training Goal 1, PT)  400+ ft  -AH      Time Frame (Gait Training Goal 1, PT)  long term goal (LTG);10 days  -AH      Progress/Outcome (Gait Training Goal 1, PT)  goal not met  -AH         Stairs Goal 1 (PT)    Activity/Assistive Device (Stairs Goal 1, PT)  ascending stairs;descending stairs;using handrail, left;using handrail, right;step-over step;decrease fall risk;improve balance and speed  -      [place] Level/Cues Needed (Stairs Goal 1, PT)  standby assist  -AH      Number of Stairs (Stairs Goal 1, PT)  8+  -AH      Time Frame (Stairs Goal 1, PT)  10 days  -AH      Progress/Outcome (Stairs Goal 1, PT)  goal not met  -AH        User Key  (r) = Recorded By, (t) = Taken By, (c) = Cosigned By    Initials Name Provider Type Discipline     Lavonne Monae PTA Physical Therapy Assistant PT              PT Discharge Summary  Reason for Discharge: Discharge from facility  Outcomes Achieved: Discharge from facility occurred on same date as evluation  Discharge Destination: Home      Lavonne Alex PTA   10/5/2021

## 2021-10-07 NOTE — THERAPY DISCHARGE NOTE
Acute Care - Occupational Therapy Discharge Summary  [facility]     Patient Name: Jesika Bowers  : 1950  MRN: [id number]    Today's Date: 10/7/2021                 Admit Date: 10/2/2021        OT Recommendation and Plan    Visit Dx:    ICD-10-CM ICD-9-CM   1. Cerebrovascular accident (CVA), unspecified mechanism (HCC)  I63.9 434.91   2. Dysphagia, unspecified type  R13.10 787.20   3. Acute ischemic left MCA stroke (HCC)  I63.512 434.91   4. Impaired mobility and ADLs  Z74.09 V49.89    Z78.9    5. Impaired functional mobility and activity tolerance  Z74.09 V49.89               OT Rehab Goals     Row Name 10/07/21 0800             Self-Feeding Goal 1 (OT)    Activity/Device (Self-Feeding Goal 1, OT)  self-feeding skills, all  -TS      [place] Level/Cues Needed (Self-Feeding Goal 1, OT)  independent  -TS      Time Frame (Self-Feeding Goal 1, OT)  long term goal (LTG);by discharge  -TS      Progress/Outcomes (Self-Feeding Goal 1, OT)  goal not met  -TS         Safety Awareness Goal 1 (OT)    Activity (Safety Awareness Goal 1, OT)  awareness of right side;insight into deficits/self awareness  -TS      [place]/Cues/Accuracy (Safety Awareness Goal 1, OT)  independent  -TS      Time Frame (Safety Awareness Goal 1, OT)  long term goal (LTG);by discharge  -TS      Progress/Outcome (Safety Awareness Goal 1, OT)  goal not met  -TS        User Key  (r) = Recorded By, (t) = Taken By, (c) = Cosigned By    Initials Name Provider Type Discipline    Nenita Benavides COTA Occupational Therapy Assistant OT          Outcome Measures     Row Name 10/04/21 0900             Functional Assessment    Outcome Measure Options  AM-PAC 6 Clicks Daily Activity (OT)  -LIAN        User Key  (r) = Recorded By, (t) = Taken By, (c) = Cosigned By    Initials Name Provider Type    Ary Head OTR/L Occupational Therapist                  OT Discharge Summary  Anticipated Discharge Disposition (OT): home with  assist  Reason for Discharge: Discharge from facility  Outcomes Achieved: Refer to plan of care for updates on goals achieved  Discharge Destination: Home with assist      THIAGO Crowell  10/7/2021

## 2021-10-08 ENCOUNTER — READMISSION MANAGEMENT (OUTPATIENT)
Dept: CALL CENTER | Facility: HOSPITAL | Age: 71
End: 2021-10-08

## 2021-10-08 NOTE — OUTREACH NOTE
Stroke Week 1 Survey      Responses   [facility] patient discharged from?  [place]   Does the patient have one of the following disease processes/diagnoses(primary or secondary)?  Stroke (TIA)   Week 1 attempt successful?  Yes   Call start time  1610   Call end time  1636   Discharge diagnosis    Acute left MCA CVA   Person spoke with today (if not patient) and relationship  patient   Meds reviewed with patient/caregiver?  Yes   Is the patient having any side effects they believe may be caused by any medication additions or changes?  Yes   Side effects comments   diarrhea r/t Lipitor   Does the patient have all medications ordered at discharge?  Yes   Is the patient taking all medications as directed (includes completed medication regime)?  Yes   Does the patient have a primary care provider?   Yes   Does the patient have an appointment with their PCP within 7 days of discharge?  Yes   Comments regarding PCP  hospital fu appt on 10/11/21   Has the patient kept scheduled appointments due by today?  N/A   Psychosocial issues?  No   Does the patient require any assistance with activities of daily living such as eating, bathing, dressing, walking, etc.?  No   Does the patient have any residual symptoms from stroke/TIA?  No   Residual symptoms comments  Patient states she is a lot better   Does the patient understand the diet ordered at discharge?  Yes   Did the patient receive a copy of their discharge instructions?  Yes   Nursing interventions  Reviewed instructions with patient   What is the patient's perception of their health status since discharge?  Improving   Is the patient able to teach back FAST for Stroke?  Yes   Is the patient/caregiver able to teach back the risk factors for a stroke?  High Cholesterol, Physical inactivity and obesity, History of TIAs, Sleep apnea   Is the patient/caregiver able to teach back signs and symptoms related to disease process for when to call PCP?  Yes   Is the  patient/caregiver able to teach back signs and symptoms related to disease process for when to call 911?  Yes   If the patient is a current smoker, are they able to teach back resources for cessation?  Not a smoker   Is the patient/caregiver able to teach back the hierarchy of who to call/visit for symptoms/problems? PCP, Specialist, Home health nurse, Urgent Care, ED, 911  Yes   Week 1 call completed?  Yes   Wrap up additional comments  Pt states she is doing so much better,  pt reports she was perscribed Lipitor, and is battling with diarrhea since she started. Pt states she called the office and they told her to take lomotil, but still having diarrhea. RN placed call to office and spoke with BATOOL Clark, which took orders from PCP to stop Lipitor until PCP hospital fu appt on 10/11/21. RN called pt back to have her stop taking Lipitor per PCP orders r/t chronic diarrhea. Questions/concerns addressed.          Sheree Gibbons RN

## 2021-10-18 ENCOUNTER — READMISSION MANAGEMENT (OUTPATIENT)
Dept: CALL CENTER | Facility: HOSPITAL | Age: 71
End: 2021-10-18

## 2021-10-18 NOTE — OUTREACH NOTE
Stroke Week 2 Survey      Responses   [facility] patient discharged from? [facility]   Does the patient have one of the following disease processes/diagnoses(primary or secondary)? Stroke (TIA)   Week 2 attempt successful? Yes   Call start time 1618   Call end time 1621   Discharge diagnosis   Acute left MCA CVA   Meds reviewed with patient/caregiver? Yes   Is the patient having any side effects they believe may be caused by any medication additions or changes? Yes   Side effects comments  diarrhea r/t Lipitor   Is the patient taking all medications as directed (includes completed medication regime)? Yes   Medication comments MD added abx for UTI. Lipitor was changed to crestor    Has the patient kept scheduled appointments due by today? Yes   What is the Home health agency?  outpt PT/speech    Does the patient require any assistance with activities of daily living such as eating, bathing, dressing, walking, etc.? No   Does the patient have any residual symptoms from stroke/TIA? No   What is the patient's perception of their health status since discharge? Improving   Nursing interventions Nurse provided patient education   Is the patient/caregiver able to teach back signs and symptoms related to disease process for when to call 911? Yes   Week 2 call completed? Yes   Revoked No further contact(revokes)-requires comment   Is the patient interested in additional calls from an ambulatory ?  NOTE:  applies to high risk patients requiring additional follow-up. No   Graduated/Revoked comments goals met. working           Loan Real RN

## 2021-10-21 ENCOUNTER — TRANSCRIBE ORDERS (OUTPATIENT)
Dept: LAB | Facility: HOSPITAL | Age: 71
End: 2021-10-21

## 2021-10-21 DIAGNOSIS — Z20.822 ENCOUNTER FOR LABORATORY TESTING FOR COVID-19 VIRUS: Primary | ICD-10-CM

## 2021-11-01 ENCOUNTER — LAB (OUTPATIENT)
Dept: LAB | Facility: HOSPITAL | Age: 71
End: 2021-11-01

## 2021-11-01 LAB — SARS-COV-2 RNA PNL SPEC NAA+PROBE: NOT DETECTED

## 2021-11-01 PROCEDURE — C9803 HOPD COVID-19 SPEC COLLECT: HCPCS | Performed by: FAMILY MEDICINE

## 2021-11-01 PROCEDURE — 87635 SARS-COV-2 COVID-19 AMP PRB: CPT | Performed by: FAMILY MEDICINE

## 2022-01-04 ENCOUNTER — CALL CENTER PROGRAMS (OUTPATIENT)
Dept: CALL CENTER | Facility: HOSPITAL | Age: 72
End: 2022-01-04

## 2022-01-04 NOTE — OUTREACH NOTE
Stroke [place] Survey      Responses   Facility patient discharged from? [facility]   Attempt successful Yes   Call start time 1537   Person spoke with today (if not patient) and relationship Patient   Did the patient die within 30 days of admission? No   Did the patient die within 30 days of discharge? No   Call end time 1547   Patient location 30 days post discharge if known Home   Was the patient readmitted within 30 days of discharge? No   Could you live alone without any help from another person? Yes   Can you do everything that you were doing right before your stroke even if slower and not as much? Yes   Are you completely back to the way you were right before your stroke? Yes   Can you walk from one room to another without help from another person? Yes   Can the patient sit up in bed without any help? Yes   Call Center [place] Score 0   [place] score call completed Yes   Comments Patient reports no residual stroke symptoms. She is able to manage all her usual daily activites.  She has recently decreased her ASA to 81mg daily d/t bruising on her extremities.  Encouraged her to discuss this change with her PCP and neurology. Discussed importance of ASA and how to spot a stroke (F.A.S.T.)  She reports she has an upcoming appointment with both this month.           Anne-Marie Maki RN

## 2022-01-25 ENCOUNTER — OFFICE VISIT (OUTPATIENT)
Dept: NEUROLOGY | Facility: CLINIC | Age: 72
End: 2022-01-25

## 2022-01-25 VITALS
WEIGHT: 132 LBS | HEART RATE: 88 BPM | DIASTOLIC BLOOD PRESSURE: 70 MMHG | SYSTOLIC BLOOD PRESSURE: 150 MMHG | HEIGHT: 65 IN | RESPIRATION RATE: 18 BRPM | BODY MASS INDEX: 21.99 KG/M2

## 2022-01-25 DIAGNOSIS — I63.512 ACUTE ISCHEMIC LEFT MCA STROKE: Primary | ICD-10-CM

## 2022-01-25 PROCEDURE — 99214 OFFICE O/P EST MOD 30 MIN: CPT | Performed by: NURSE PRACTITIONER

## 2022-01-25 RX ORDER — CALCIUM CARBONATE 500(1250)
TABLET ORAL EVERY 12 HOURS SCHEDULED
COMMUNITY

## 2022-01-25 RX ORDER — ROSUVASTATIN CALCIUM 10 MG/1
20 TABLET, COATED ORAL DAILY
COMMUNITY

## 2022-01-25 RX ORDER — ASPIRIN 81 MG/1
81 TABLET ORAL DAILY
COMMUNITY
End: 2022-09-19

## 2022-04-21 ENCOUNTER — TELEPHONE (OUTPATIENT)
Dept: NEUROLOGY | Facility: CLINIC | Age: 72
End: 2022-04-21

## 2022-04-21 NOTE — TELEPHONE ENCOUNTER
Jesika said she had the eye exam at [facility].  Explained that I will request a copy of the exam and send a message to [employer].

## 2022-04-21 NOTE — TELEPHONE ENCOUNTER
Caller: ZULAY     Best call back number: [phone]    What was the call regarding: PT SAID WENT TO SEE DR DOM WOLFE.   FOR HER EYES AND ALL WAS FINE. SHE  WANTS TO KNOW IF SHE EVEN NEEDS  A FOLLOW UP WITH NEURO ?     Do you require a callback: YES     PLEASE ADVISE

## 2022-04-22 NOTE — TELEPHONE ENCOUNTER
Jesika said she is out of town and needs to cancel her appointment.  She is doing well and doesn't want to reschedule at this time.  She has an appointment with her PCP and will discuss the need of following with neuro.

## 2022-07-06 ENCOUNTER — APPOINTMENT (OUTPATIENT)
Dept: CT IMAGING | Facility: HOSPITAL | Age: 72
End: 2022-07-06

## 2022-07-06 ENCOUNTER — APPOINTMENT (OUTPATIENT)
Dept: GENERAL RADIOLOGY | Facility: HOSPITAL | Age: 72
End: 2022-07-06

## 2022-07-06 ENCOUNTER — HOSPITAL ENCOUNTER (EMERGENCY)
Facility: HOSPITAL | Age: 72
Discharge: ANOTHER HOSPITAL | End: 2022-07-06
Attending: EMERGENCY MEDICINE | Admitting: EMERGENCY MEDICINE

## 2022-07-06 VITALS
TEMPERATURE: 98.5 F | BODY MASS INDEX: 23.58 KG/M2 | OXYGEN SATURATION: 100 % | HEART RATE: 87 BPM | SYSTOLIC BLOOD PRESSURE: 156 MMHG | DIASTOLIC BLOOD PRESSURE: 71 MMHG | HEIGHT: 66 IN | WEIGHT: 146.7 LBS | RESPIRATION RATE: 17 BRPM

## 2022-07-06 DIAGNOSIS — I63.9 CEREBROVASCULAR ACCIDENT (CVA), UNSPECIFIED MECHANISM: Primary | ICD-10-CM

## 2022-07-06 LAB
ABO GROUP BLD: NORMAL
ALBUMIN SERPL-MCNC: 4.7 G/DL (ref 3.5–5.2)
ALBUMIN/GLOB SERPL: 1.7 G/DL
ALP SERPL-CCNC: 82 U/L (ref 39–117)
ALT SERPL W P-5'-P-CCNC: 15 U/L (ref 1–33)
ANION GAP SERPL CALCULATED.3IONS-SCNC: 10 MMOL/L (ref 5–15)
APTT PPP: 30.8 SECONDS (ref 24.1–35)
AST SERPL-CCNC: 22 U/L (ref 1–32)
BASOPHILS # BLD AUTO: 0.04 10*3/MM3 (ref 0–0.2)
BASOPHILS NFR BLD AUTO: 0.5 % (ref 0–1.5)
BILIRUB SERPL-MCNC: 1 MG/DL (ref 0–1.2)
BLD GP AB SCN SERPL QL: NEGATIVE
BUN SERPL-MCNC: 23 MG/DL (ref 8–23)
BUN/CREAT SERPL: 29.9 (ref 7–25)
CALCIUM SPEC-SCNC: 10.3 MG/DL (ref 8.6–10.5)
CHLORIDE SERPL-SCNC: 102 MMOL/L (ref 98–107)
CO2 SERPL-SCNC: 28 MMOL/L (ref 22–29)
CREAT SERPL-MCNC: 0.77 MG/DL (ref 0.57–1)
DEPRECATED RDW RBC AUTO: 49.8 FL (ref 37–54)
EGFRCR SERPLBLD CKD-EPI 2021: 83.6 ML/MIN/1.73
EOSINOPHIL # BLD AUTO: 0.14 10*3/MM3 (ref 0–0.4)
EOSINOPHIL NFR BLD AUTO: 1.8 % (ref 0.3–6.2)
ERYTHROCYTE [DISTWIDTH] IN BLOOD BY AUTOMATED COUNT: 13.5 % (ref 12.3–15.4)
GLOBULIN UR ELPH-MCNC: 2.8 GM/DL
GLUCOSE BLDC GLUCOMTR-MCNC: 79 MG/DL (ref 70–130)
GLUCOSE SERPL-MCNC: 87 MG/DL (ref 65–99)
HCT VFR BLD AUTO: 37.8 % (ref 34–46.6)
HGB BLD-MCNC: 12.2 G/DL (ref 12–15.9)
HOLD SPECIMEN: NORMAL
HOLD SPECIMEN: NORMAL
IMM GRANULOCYTES # BLD AUTO: 0.02 10*3/MM3 (ref 0–0.05)
IMM GRANULOCYTES NFR BLD AUTO: 0.3 % (ref 0–0.5)
INR PPP: 1.01 (ref 0.91–1.09)
LYMPHOCYTES # BLD AUTO: 2.55 10*3/MM3 (ref 0.7–3.1)
LYMPHOCYTES NFR BLD AUTO: 33.6 % (ref 19.6–45.3)
MCH RBC QN AUTO: 32.4 PG (ref 26.6–33)
MCHC RBC AUTO-ENTMCNC: 32.3 G/DL (ref 31.5–35.7)
MCV RBC AUTO: 100.3 FL (ref 79–97)
MONOCYTES # BLD AUTO: 0.48 10*3/MM3 (ref 0.1–0.9)
MONOCYTES NFR BLD AUTO: 6.3 % (ref 5–12)
NEUTROPHILS NFR BLD AUTO: 4.35 10*3/MM3 (ref 1.7–7)
NEUTROPHILS NFR BLD AUTO: 57.5 % (ref 42.7–76)
NRBC BLD AUTO-RTO: 0 /100 WBC (ref 0–0.2)
PLATELET # BLD AUTO: 290 10*3/MM3 (ref 140–450)
PMV BLD AUTO: 10.7 FL (ref 6–12)
POTASSIUM SERPL-SCNC: 4.1 MMOL/L (ref 3.5–5.2)
PROT SERPL-MCNC: 7.5 G/DL (ref 6–8.5)
PROTHROMBIN TIME: 12.9 SECONDS (ref 11.9–14.6)
RBC # BLD AUTO: 3.77 10*6/MM3 (ref 3.77–5.28)
RH BLD: POSITIVE
SARS-COV-2 RNA PNL SPEC NAA+PROBE: NOT DETECTED
SODIUM SERPL-SCNC: 140 MMOL/L (ref 136–145)
T&S EXPIRATION DATE: NORMAL
TROPONIN T SERPL-MCNC: <0.01 NG/ML (ref 0–0.03)
WBC NRBC COR # BLD: 7.58 10*3/MM3 (ref 3.4–10.8)
WHOLE BLOOD HOLD COAG: NORMAL
WHOLE BLOOD HOLD SPECIMEN: NORMAL

## 2022-07-06 PROCEDURE — 84484 ASSAY OF TROPONIN QUANT: CPT | Performed by: EMERGENCY MEDICINE

## 2022-07-06 PROCEDURE — 99284 EMERGENCY DEPT VISIT MOD MDM: CPT

## 2022-07-06 PROCEDURE — 93005 ELECTROCARDIOGRAM TRACING: CPT | Performed by: EMERGENCY MEDICINE

## 2022-07-06 PROCEDURE — 96374 THER/PROPH/DIAG INJ IV PUSH: CPT

## 2022-07-06 PROCEDURE — 96365 THER/PROPH/DIAG IV INF INIT: CPT

## 2022-07-06 PROCEDURE — 96375 TX/PRO/DX INJ NEW DRUG ADDON: CPT

## 2022-07-06 PROCEDURE — 85730 THROMBOPLASTIN TIME PARTIAL: CPT | Performed by: EMERGENCY MEDICINE

## 2022-07-06 PROCEDURE — 86901 BLOOD TYPING SEROLOGIC RH(D): CPT | Performed by: EMERGENCY MEDICINE

## 2022-07-06 PROCEDURE — 70496 CT ANGIOGRAPHY HEAD: CPT

## 2022-07-06 PROCEDURE — 0 IOPAMIDOL PER 1 ML: Performed by: EMERGENCY MEDICINE

## 2022-07-06 PROCEDURE — 86900 BLOOD TYPING SEROLOGIC ABO: CPT | Performed by: EMERGENCY MEDICINE

## 2022-07-06 PROCEDURE — 25010000002 ALTEPLASE PER 1 MG: Performed by: PSYCHIATRY & NEUROLOGY

## 2022-07-06 PROCEDURE — 87635 SARS-COV-2 COVID-19 AMP PRB: CPT | Performed by: EMERGENCY MEDICINE

## 2022-07-06 PROCEDURE — 99285 EMERGENCY DEPT VISIT HI MDM: CPT | Performed by: PSYCHIATRY & NEUROLOGY

## 2022-07-06 PROCEDURE — 70450 CT HEAD/BRAIN W/O DYE: CPT

## 2022-07-06 PROCEDURE — 86850 RBC ANTIBODY SCREEN: CPT | Performed by: EMERGENCY MEDICINE

## 2022-07-06 PROCEDURE — 93010 ELECTROCARDIOGRAM REPORT: CPT | Performed by: EMERGENCY MEDICINE

## 2022-07-06 PROCEDURE — 80053 COMPREHEN METABOLIC PANEL: CPT | Performed by: EMERGENCY MEDICINE

## 2022-07-06 PROCEDURE — 25010000002 ALTEPLASE PER 1 MG

## 2022-07-06 PROCEDURE — 82962 GLUCOSE BLOOD TEST: CPT

## 2022-07-06 PROCEDURE — 0042T HC CT CEREBRAL PERFUSION W/WO CONTRAST: CPT

## 2022-07-06 PROCEDURE — 99285 EMERGENCY DEPT VISIT HI MDM: CPT

## 2022-07-06 PROCEDURE — 70498 CT ANGIOGRAPHY NECK: CPT

## 2022-07-06 PROCEDURE — C9803 HOPD COVID-19 SPEC COLLECT: HCPCS | Performed by: EMERGENCY MEDICINE

## 2022-07-06 PROCEDURE — 71045 X-RAY EXAM CHEST 1 VIEW: CPT

## 2022-07-06 PROCEDURE — 96361 HYDRATE IV INFUSION ADD-ON: CPT

## 2022-07-06 PROCEDURE — 85610 PROTHROMBIN TIME: CPT | Performed by: EMERGENCY MEDICINE

## 2022-07-06 PROCEDURE — 85025 COMPLETE CBC W/AUTO DIFF WBC: CPT | Performed by: EMERGENCY MEDICINE

## 2022-07-06 RX ORDER — SODIUM CHLORIDE 9 MG/ML
100 INJECTION, SOLUTION INTRAVENOUS ONCE
Status: COMPLETED | OUTPATIENT
Start: 2022-07-06 | End: 2022-07-06

## 2022-07-06 RX ORDER — SODIUM CHLORIDE 0.9 % (FLUSH) 0.9 %
10 SYRINGE (ML) INJECTION AS NEEDED
Status: DISCONTINUED | OUTPATIENT
Start: 2022-07-06 | End: 2022-07-06 | Stop reason: HOSPADM

## 2022-07-06 RX ORDER — SODIUM CHLORIDE 9 MG/ML
75 INJECTION, SOLUTION INTRAVENOUS CONTINUOUS
Status: DISCONTINUED | OUTPATIENT
Start: 2022-07-06 | End: 2022-07-06 | Stop reason: HOSPADM

## 2022-07-06 RX ADMIN — SODIUM CHLORIDE 100 ML: 9 INJECTION, SOLUTION INTRAVENOUS at 14:32

## 2022-07-06 RX ADMIN — IOPAMIDOL 125 ML: 755 INJECTION, SOLUTION INTRAVENOUS at 13:35

## 2022-07-06 RX ADMIN — ALTEPLASE 53.9 MG: KIT at 13:22

## 2022-07-06 RX ADMIN — SODIUM CHLORIDE 75 ML/HR: 9 INJECTION, SOLUTION INTRAVENOUS at 14:33

## 2022-07-06 NOTE — ED NOTES
Patient arrival to ER entrance via wheelchair /  with assist ER bryson Cortes.  reports that his wife is having a stroke and reports that she has had one in past. Charge nurse notified and to be roomed in bed 5.  parking car at this time. Patient reports onset of symptoms to be 0730 this am

## 2022-07-06 NOTE — ED PROVIDER NOTES
Subjective   Patient is 71 years old and approximately 12:10 PM today had sudden onset of left-sided weakness flaccid paralysis with facial asymmetry and neglect brought to the ER immediately has had a stroke last year on the opposite side which resolved with tPA.  There was no syncope associated with this.      Stroke  Presenting symptoms: confusion, language symptoms, sensory loss and weakness    Onset quality:  Sudden  Last known well:  12:10  Timing:  Constant  Similar to previous episodes: yes    Associated symptoms: no chest pain, no difficulty swallowing, no dizziness, no facial pain, no fall, no fever, no hearing loss, no bladder incontinence, no nausea, no neck pain, no paresthesias, no seizures, no tinnitus, no vertigo and no vomiting        Review of Systems   Constitutional: Negative for fever.   HENT: Negative.  Negative for hearing loss, tinnitus and trouble swallowing.    Eyes: Negative.    Respiratory: Negative.    Cardiovascular: Negative.  Negative for chest pain.   Gastrointestinal: Negative.  Negative for nausea and vomiting.   Genitourinary: Negative for bladder incontinence.   Musculoskeletal: Negative.  Negative for back pain and neck pain.   Skin: Negative.    Neurological: Positive for weakness. Negative for dizziness, vertigo, seizures and paresthesias.   Psychiatric/Behavioral: Positive for confusion.   All other systems reviewed and are negative.      Past Medical History:   Diagnosis Date   • Stroke (HCC)        Allergies   Allergen Reactions   • Gadolinium Derivatives Anaphylaxis     Cardiac arrest per .       Past Surgical History:   Procedure Laterality Date   • HYSTERECTOMY     • OOPHORECTOMY         Family History   Problem Relation Age of Onset   • Breast cancer Maternal Aunt    • No Known Problems Mother    • No Known Problems Father    • No Known Problems Sister    • No Known Problems Brother    • No Known Problems Daughter    • No Known Problems Son    • No Known Problems  Maternal Grandmother    • No Known Problems Paternal Grandmother    • No Known Problems Paternal Aunt    • No Known Problems Other    • BRCA 1/2 Neg Hx    • Colon cancer Neg Hx    • Endometrial cancer Neg Hx    • Ovarian cancer Neg Hx        Social History     Socioeconomic History   • Marital status:    Tobacco Use   • Smoking status: Never Smoker   • Smokeless tobacco: Never Used   Substance and Sexual Activity   • Alcohol use: Never   • Drug use: Never   • Sexual activity: Defer           Objective   Physical Exam  Vitals and nursing note reviewed. Exam conducted with a chaperone present.   Constitutional:       General: She is awake. She is not in acute distress.     Appearance: Normal appearance. She is well-developed. She is not toxic-appearing or diaphoretic.   HENT:      Head: Normocephalic and atraumatic.      Mouth/Throat:      Mouth: Mucous membranes are moist.      Pharynx: Oropharynx is clear.   Eyes:      General: Lids are normal. Lids are everted, no foreign bodies appreciated.      Pupils: Pupils are equal, round, and reactive to light.   Neck:      Thyroid: No thyromegaly.      Vascular: Normal carotid pulses. No carotid bruit or JVD.      Trachea: Trachea and phonation normal. No tracheal tenderness or tracheal deviation.      Meningeal: Brudzinski's sign and Kernig's sign absent.   Cardiovascular:      Rate and Rhythm: Normal rate and regular rhythm.      Pulses: Normal pulses.      Heart sounds: Normal heart sounds.   Pulmonary:      Effort: Pulmonary effort is normal. No tachypnea or respiratory distress.      Breath sounds: Normal breath sounds. No stridor.   Abdominal:      General: Abdomen is flat. Bowel sounds are normal. There is no distension.      Palpations: Abdomen is soft. There is no mass.      Tenderness: There is no abdominal tenderness. There is no guarding.   Musculoskeletal:         General: Normal range of motion.      Cervical back: Full passive range of motion without  pain, normal range of motion and neck supple. No rigidity.   Skin:     General: Skin is warm and dry.      Capillary Refill: Capillary refill takes less than 2 seconds.      Coloration: Skin is not pale.      Nails: There is no clubbing.   Neurological:      General: No focal deficit present.      Mental Status: She is alert and oriented to person, place, and time. She is confused. She is not disoriented.      GCS: GCS eye subscore is 4. GCS verbal subscore is 4. GCS motor subscore is 6.      Cranial Nerves: Facial asymmetry present. No cranial nerve deficit.      Sensory: Sensory deficit present.      Motor: Weakness, abnormal muscle tone and pronator drift present.      Deep Tendon Reflexes: Reflexes are normal and symmetric. Reflexes normal. Babinski sign absent on the right side. Babinski sign absent on the left side.      Reflex Scores:       Bicep reflexes are 2+ on the right side.       Patellar reflexes are 2+ on the right side.     Comments: Patient with left-sided weakness plus paralysis and neglect   Psychiatric:         Behavior: Behavior is cooperative.         Procedures           ED Course  ED Course as of 07/06/22 1516   Wed Jul 06, 2022   1353 Code stroke initiated neurology saw the patient and started tPA [TS]   1414 Normal sinus rhythm [TS]   1515 Patient's care was taken over Dr. Smith who proceeded with the tPA.  And then had the patient transferred to [place] and made arrangements and discussed this case with accepting physician herself [TS]      ED Course User Index  [TS] Sudhakar Arenas MD                                           MDM  Number of Diagnoses or Management Options  Diagnosis management comments: Patient has obvious stroke code stroke was called neurologist is at the bedside       Amount and/or Complexity of Data Reviewed  Clinical lab tests: ordered and reviewed  Tests in the radiology section of CPT®: ordered and reviewed  Tests in the medicine section of CPT®: reviewed and  ordered    Risk of Complications, Morbidity, and/or Mortality  Presenting problems: high  Diagnostic procedures: high  Management options: high        Final diagnoses:   Cerebrovascular accident (CVA), unspecified mechanism (HCC)       ED Disposition  ED Disposition     ED Disposition   Transfer to Another Facility     Condition   --    Comment   --             No follow-up provider specified.       Medication List      No changes were made to your prescriptions during this visit.          Sudhakar Arenas MD  07/06/22 7132       Sudhakar Arenas MD  07/06/22 [id number]

## 2022-07-06 NOTE — CONSULTS
Neurology Consult Note    Patient:  Jesika Bowers   YOB: 1950  MRN:  [id number]  Date of Admission:  7/6/2022 12:44 PM    Date: 7/6/2022    Referring Provider: Sudhakar Arenas MD  Reason for Consultation: Code Stroke      History of present illness:     This is a 71 y.o. right handed female.with H/O hyperlipidemia, and previous stroke for which she takes a half tab of aspirin a day and is not on anticoagulation. And had a sudden onset of left sided weakness at 12:05 witnessed by her   She had gone to the bank earlier and he saw her come into his office walking and then suddenly develop left sided weakness    Head CT reveals the old left MCA territory stroke that did occur in October of 2021 when she received TPA as she presented with acute onset of aphasia and difficulty walking  But today's Head CT also shows an old right MCA stroke not present in October of 2021.  She did receive TPA in October of 21  LDL in 10/2021 was 135 and she has been on [facility]  MRI in October of 2021:confirmed left MCA  DWI:    Patient reports allergy to dye but  says she is not and she had CTA with dye in October without incidence. Reportedly she is allergic does have an allergy to gadolinium    CTA showed occlusion of the proximal M2 on the right    Past Medical History:   Diagnosis Date   • Stroke (HCC)        Past Surgical History:   Procedure Laterality Date   • HYSTERECTOMY     • OOPHORECTOMY         Prior to Admission medications    Medication Sig Start Date End Date Taking? Authorizing Provider   aspirin 81 MG EC tablet Take 81 mg by mouth Daily.    Rene Ramires MD   Calcium 500 MG tablet Every 12 (Twelve) Hours.    Rene Ramires MD   glucosamine-chondroitin 500-400 MG capsule capsule Take 1 capsule by mouth 3 (Three) Times a Day With Meals.    Rene Ramires MD   multivitamin with minerals tablet tablet Take 1 tablet by mouth Daily.    Rene Ramires MD   Omega-3  Fatty Acids (fish oil) 1000 MG capsule capsule Take  by mouth Daily With Breakfast.    Provider, MD Rene   rosuvastatin (CRESTOR) 20 MG tablet Take 20 mg by mouth Daily.    Provider, MD Rene       Hospital scheduled medications:      Hospital PRN medications:  •  sodium chloride    Allergies   Allergen Reactions   • Gadolinium Derivatives Anaphylaxis     Cardiac arrest per .       Social History     Socioeconomic History   • Marital status:    Tobacco Use   • Smoking status: Never Smoker   • Smokeless tobacco: Never Used   Substance and Sexual Activity   • Alcohol use: Never   • Drug use: Never   • Sexual activity: Defer     Family History   Problem Relation Age of Onset   • Breast cancer Maternal Aunt    • No Known Problems Mother    • No Known Problems Father    • No Known Problems Sister    • No Known Problems Brother    • No Known Problems Daughter    • No Known Problems Son    • No Known Problems Maternal Grandmother    • No Known Problems Paternal Grandmother    • No Known Problems Paternal Aunt    • No Known Problems Other    • BRCA 1/2 Neg Hx    • Colon cancer Neg Hx    • Endometrial cancer Neg Hx    • Ovarian cancer Neg Hx        Review of Systems  A 14-point review of systems was reviewed and was negative except for left sided weakness and numbness, visual field deficit    Vital Signs   Temp:  [98.5 °F (36.9 °C)] 98.5 °F (36.9 °C)  Heart Rate:  [80] 80  Resp:  [17] 17  BP: (175)/(94) 175/94    General Exam:  Head:  Normocephalic, atraumatic  HEENT:  Neck supple  Fundoscopic Exam:  No signs of disc edema  CVS:  Regular rate and rhythm.  No murmurs  Carotid Examination:  No bruits  Lungs:  Clear to auscultation  Abdomen:  Nontender, nondistended  Extremities:  No signs of peripheral edema  Skin:  No rashes    Neurologic Exam:    Mental Status:    -Awake, Alert, Oriented X 3  -No word finding difficulties  -No aphasia  -Mild dysarthria  -Follows simple and complex commands    CN  II:  Visual fields: [facility]  Pupils equally reactive to light  CN III, IV, VI:  Extraocular Muscles show eyes deviated to the right  CN V:  Facial sensory is asymmetric and decreased on the left  CN VII:  Facial motor asymmetric showing lower face weakness on the left  CN VIII:  Gross hearing intact bilaterally  CN IX/X:  Palate elevates symmetrically  CN XI:  Shoulder shrug symmetric  CN XII:  Tongue is midline on protrusion    Motor: (strength out of 5:  1= minimal movement, 2 = movement in plane of gravity, 3 = movement against gravity, 4 = movement against some resistance, 5 = full strength)    -Right Upper Ext: Proximal: 5 Distal: 5  -Left Upper Ext: 0/5    -Right Lower Ext: Proximal: 5 Distal: 5  -Left Lower Ext: 0/5  DTR:  -Right   Biceps: 2+ Triceps: 2+ Brachioradialis: 2+   Patella: 2+ Ankle: 2+ Neg Babinski  -Left   Biceps: 2+ Triceps: 2+ Brachioradialis: 2+   Patella: 2+ Ankle: 2+ Babinski    Sensory:  -Decreased  to light touch, pinprick on the left    Coordination:  -Finger to nose intact on the right  -Heel to shin intact on the right  -No ataxia    Gait  -Unable to ambulate      Results Review:    Lab Results (last 7 days)     Procedure Component Value Units Date/Time    Comprehensive Metabolic Panel [[id number]]  (Abnormal) Collected: 07/06/22 1301    Specimen: Blood Updated: 07/06/22 1342     Glucose 87 mg/dL      BUN 23 mg/dL      Creatinine 0.77 mg/dL      Sodium 140 mmol/L      Potassium 4.1 mmol/L      Chloride 102 mmol/L      CO2 28.0 mmol/L      Calcium 10.3 mg/dL      Total Protein 7.5 g/dL      Albumin 4.70 g/dL      ALT (SGPT) 15 U/L      AST (SGOT) 22 U/L      Alkaline Phosphatase 82 U/L      Total Bilirubin 1.0 mg/dL      Globulin 2.8 gm/dL      A/G Ratio 1.7 g/dL      BUN/Creatinine Ratio 29.9     Anion Gap 10.0 mmol/L      eGFR 83.6 mL/min/1.73      Comment: National Kidney Foundation and American Society of Nephrology (ASN) Task Force recommended calculation based on the Chronic Kidney  Disease Epidemiology Collaboration (CKD-EPI) equation refit without adjustment for race.       Narrative:      GFR Normal >60  Chronic Kidney Disease <60  Kidney Failure <15      Troponin [[id number]]  (Normal) Collected: 07/06/22 1301    Specimen: Blood Updated: 07/06/22 1339     Troponin T <0.010 ng/mL     Narrative:      Troponin T Reference Range:  <= 0.03 ng/mL-   Negative for AMI  >0.03 ng/mL-     Abnormal for myocardial necrosis.  Clinicians would have to utilize clinical acumen, EKG, Troponin and serial changes to determine if it is an Acute Myocardial Infarction or myocardial injury due to an underlying chronic condition.       Results may be falsely decreased if patient taking Biotin.      Protime-INR [[id number]]  (Normal) Collected: 07/06/22 1301    Specimen: Blood Updated: 07/06/22 1333     Protime 12.9 Seconds      INR 1.01    aPTT [[id number]]  (Normal) Collected: 07/06/22 1301    Specimen: Blood Updated: 07/06/22 1333     PTT 30.8 seconds     CBC & Differential [[id number]]  (Abnormal) Collected: 07/06/22 1301    Specimen: Blood Updated: 07/06/22 1324    Narrative:      The following orders were created for panel order CBC & Differential.  Procedure                               Abnormality         Status                     ---------                               -----------         ------                     CBC Auto Differential[[id number]]        Abnormal            Final result                 Please view results for these tests on the individual orders.    CBC Auto Differential [[id number]]  (Abnormal) Collected: 07/06/22 1301    Specimen: Blood Updated: 07/06/22 1324     WBC 7.58 10*3/mm3      RBC 3.77 10*6/mm3      Hemoglobin 12.2 g/dL      Hematocrit 37.8 %      .3 fL      MCH 32.4 pg      MCHC 32.3 g/dL      RDW 13.5 %      RDW-SD 49.8 fl      MPV 10.7 fL      Platelets 290 10*3/mm3      Neutrophil % 57.5 %      Lymphocyte % 33.6 %      Monocyte % 6.3 %      Eosinophil % 1.8 %       Basophil % 0.5 %      Immature Grans % 0.3 %      Neutrophils, Absolute 4.35 10*3/mm3      Lymphocytes, Absolute 2.55 10*3/mm3      Monocytes, Absolute 0.48 10*3/mm3      Eosinophils, Absolute 0.14 10*3/mm3      Basophils, Absolute 0.04 10*3/mm3      Immature Grans, Absolute 0.02 10*3/mm3      nRBC 0.0 /100 WBC     Red Top [[id number]] Collected: 07/06/22 1301    Specimen: Blood Updated: 07/06/22 1318    Lavender Top [[id number]] Collected: 07/06/22 1301    Specimen: Blood Updated: 07/06/22 1318    Green Top (Gel) [[id number]] Collected: 07/06/22 1301    Specimen: Blood Updated: 07/06/22 1318    [place] Draw [[id number]] Collected: 07/06/22 1301    Specimen: Blood Updated: 07/06/22 1318    Narrative:      The following orders were created for panel order [place] Draw.  Procedure                               Abnormality         Status                     ---------                               -----------         ------                     Green Top (Gel)[[id number]]                                  In process                 Lavender Top[[id number]]                                     In process                 Red Top[[id number]]                                          In process                 Light Blue Top[[id number]]                                   In process                   Please view results for these tests on the individual orders.    Light Blue Top [[id number]] Collected: 07/06/22 1301    Specimen: Blood Updated: 07/06/22 1318    POC Glucose Once [[id number]]  (Normal) Collected: 07/06/22 1249    Specimen: Blood Updated: 07/06/22 1301     Glucose 79 mg/dL      Comment: : [id number] Munira Alejandra ([place]) CMeter ID: [id number]                 .  Imaging Results (Last 24 Hours)     Procedure Component Value Units Date/Time    CT CEREBRAL PERFUSION WITH & WITHOUT CONTRAST [[id number]] Collected: 07/06/22 1421     Updated: 07/06/22 1427    Narrative:      CT CEREBRAL PERFUSION W WO CONTRAST- 7/6/2022 1:15  PM CDT     HISTORY: Neuro deficit, acute, stroke suspected     Technique:      1. Perfusion CT is performed to acquire images tracking the temporal  course of iodinated contrast material passing through the cerebral  circulation. Perfusion parameters, such as cerebral blood flow (CBF),  cerebral blood volume (CBV), mean transit time (MTT), etc. are  calculated by [employer] with additional provided perfusion maps and  estimated stroke volumes.   2. Automated exposure control (AEC) protocols are utilized on the  scanner to ensure dose lowered technique.      Comparison: Noncontrast CT brain and brain angiogram dated 7/6/2022 1:15  PM CDT     CT dose: DLP  1620  mGycm     Findings:     Abnormal perfusion exam, with size and distribution of the perfusion  abnormality described below.     Distribution: Perfusion abnormality involves the Right MCA vascular  distribution, inferior division.     Tmax >6.0 seconds volume: 76 ml     CBF < 30% volume: 19 ml     Mismatch volume: 57 ml      Mismatch ratio: 4.0       Impression:      Impression:  1. Abnormal perfusion exam which corresponds with the proximal right M2  occlusion which was seen on the angiogram. Tmax abnormality 76 mL with  mismatch volume 57 mL.  This report was finalized on 07/06/2022 14:24 by Dr Jabari Rausch, .    CT Angiogram Head w AI Analysis of LVO [[id number]] Collected: 07/06/22 1416     Updated: 07/06/22 1424    Narrative:      EXAM: CT ANGIOGRAM HEAD W AI ANALYSIS OF LVO- - 7/6/2022 1:14 PM CDT     HISTORY: Acute Stroke       COMPARISON: CT scan dated 7/6/2022.      DOSE LENGTH PRODUCT: 163 mGy cm. Automated exposure control was also  utilized to decrease patient radiation dose.     TECHNIQUE: CTA head performed with intravenous contrast. 3D  postprocessing, including MIPs, performed and images saved to PACS.      FINDINGS:      Distal ICAs are patent and without flow-limiting stenosis. No  intracranial large vessel occlusion. Anterior cerebral arteries  are  patent. Proximal right M2 occlusion, inferior division. Left-sided  middle cerebral arteries are patent. Intracranial vertebral arteries and  basilar artery are patent. Posterior cerebral arteries are patent and  normal in caliber. No visualized aneurysm or vascular malformation.      There are areas of old cortical ischemia in the right frontal lobe and  left parietal lobe. Orbital contents are unremarkable.       Impression:         1. Proximal right M2 occlusion, inferior division.     Findings and recommendations were discussed with NATHALIA CHIU on  7/6/2022 at 2:21 PM CDT.     This report was finalized on 07/06/2022 14:21 by Dr Jabari Rausch, .    XR Chest 1 View [[id number]] Collected: 07/06/22 1420     Updated: 07/06/22 1424    Narrative:      EXAMINATION:  XR CHEST 1 VW-  7/6/2022 12:57 PM CDT     HISTORY: Acute stroke protocol.     COMPARISON: 10/2/2021.     TECHNIQUE: Single view AP image.     FINDINGS:  There is hypoventilation and vascular crowding. There is  minimal patchy infiltrate in the lung bases left greater than right.  Heart size is upper limits of normal. No acute bony abnormality is seen.       Impression:      1. Hypoventilation with vascular crowding.  2. Minimal patchy infiltrate in the lung bases left greater than right,  likely atelectasis. Pneumonia less likely.        This report was finalized on 07/06/2022 14:21 by Dr. Jesus Harman MD.    CT Angiogram Neck [[id number]] Collected: 07/06/22 1400     Updated: 07/06/22 1410    Narrative:      EXAMINATION: CT ANGIOGRAM NECK-      7/6/2022 1:14 PM CDT     HISTORY: Carotid artery stenosis     In order to have a CT radiation dose as low as reasonably achievable  Automated Exposure Control was utilized for adjustment of the mA and/or  KV according to patient size.     DLP in mGycm= 163     The CT Angiography of the neck is performed after intravenous contrast  enhancement.     Images are acquired in axial plane and subsequent 2-D  reconstruction in  coronal and sagittal planes and 3-D maximum intensity projection  reconstruction.     There is no previous similar study for comparison. Correlation made with  CT scan of the head obtained arterial today.     Limited visualized thoracic aortic arch appear unremarkable.     There is normal origin of the brachiocephalic trunk, left common carotid  artery and its left subclavian artery from the aortic arch.     The brachiocephalic trunk divides into normal-appearing right common  carotid and right subclavian arteries.     Both common carotid arteries are normal in course and caliber. No focal  stenosis or aneurysmal dilatation.     Both common carotid arteries dividing into normal-appearing intrarenal  external carotid arteries. There is a small calcific plaque located  posteriorly in the proximal part of the left carotid bulb. No stenosis.  The remaining internal carotid arteries bilaterally normal in course and  caliber. No areas of focal stenosis or aneurysmal dilatation.     Normal external carotid arteries bilaterally is seen with normal  branches.     Normal size internal carotid artery or seen at the base of the skull in  the carotid canal and in the cavernous sinus. The intracranial courses  reviewed and reported separately.     There is normal origin course and caliber of both vertebral arteries  throughout the neck. Normal size vertebral arteries enter the foramen  magnum and joint to make a normal size basilar artery is subsequently  divides into normal-appearing posterior cerebral arteries. The  intracranial courses reviewed and reported separately.     The soft tissues of the neck are unremarkable. The limited visualized  lung apices show chronic emphysematous changes.       Impression:      1. Normal CT angiogram of the neck as detailed above.  2. The NASCET criteria was utilized for evaluation of carotid arterial  stenosis.                          This report was finalized on  07/06/2022 14:07 by Dr. Cindy Taveras MD.    CT Head Without Contrast Stroke Protocol [[id number]] Collected: 07/06/22 1304     Updated: 07/06/22 1329    Addenda:        Addendum:     CT HEAD WO CONTRAST STROKE PROTOCOL-      7/6/2022 1:02 PM CDT     I have reviewed this case with Dr. Vieyra.     The right frontal lobe cortical and subcortical infarct measures  approximately 2 cm in diameter.  This finding cannot be seen on the previous head CT from October 3,  2021, however, this infarct has similar hypodensity has a left-sided  infarct which was seen to be an acute/subacute finding on the exam from  10 months ago.  Therefore, while this may not be as old as the left-sided infarct it is  my opinion that this is a nonacute finding based on the low density  appearance.  This report was finalized on 07/06/2022 13:26 by Dr. Dipesh Dupree MD.  Signed: 07/06/22 1326 by Garrett Dupree MD    Narrative:      EXAMINATION: CT HEAD WO CONTRAST STROKE PROTOCOL-      7/6/2022 1:02 PM CDT     HISTORY: Stroke, follow up     In order to have a CT radiation dose as low as reasonably achievable  Automated Exposure Control was utilized for adjustment of the mA and/or  KV according to patient size.     DLP in mGycm= 565     The CT scan of the head is performed without intravenous contrast  enhancement. Images are acquired in axial plane with subsequent  reconstruction in coronal and sagittal plane.     Comparison is made with the previous study dated 10/3/2021.     There is a wedge-shaped area of decreased attenuation in the right  frontal lobe which was not seen in the previous study. This may  represent an acute or subacute infarction.     There is a larger area of decreased attenuation the left parietal lobe  which was seen in the previous study and represent an area of  encephalomalacia/chronic infarction.     There is no evidence of intracranial hemorrhage or hematoma.     No discrete mass. No midline shift.     The  ventricles, the basal cisterns and the cortical sulci are  unremarkable.     Images are reviewed in bone window show no acute bony abnormality. The  visualized paranasal sinuses and mastoid air cells are clear.       Impression:      1. An acute or subacute infarction right frontal lobe. Further follow-up  with MR imaging of the brain may be obtained.  2. Large area of encephalomalacia/chronic infarction the left parietal  lobe is similar to the previous study in 2021. No change.     The above report was called to the ER physician at 1:12 PM.                             This report was finalized on 07/06/2022 13:12 by Dr. Cindy Taveras MD.          Head CT personally reviewed and does not show acute right MCA stroke but does have an old one that was not present in October of 2021 in addition to the old right MCA stroke that was present in October 2021      Impression  1. Acute right MCA stroke s/p TPA with improvement  2. Occlusion of right M2 inferior division with mismatch of 57 ml  3. Hyperlipidemia  4. Previous known stroke in the left MCA territory  5. Head CT showing previous right MCA territory stroke that was asymptomatic and occurred since October 2021 but is old.     Currently she has 4/5 weakness and sensory loss on the left and left facial droop and dysarthria and left HH visual field deficit and right gaze    Plan    Discussed with radiologist that this was not an acute right MCA stroke that was seen on Head CT  And addendum will be placed on report  It was new since October 2021    I spoke with Dr Calvin snell who suggested transfer via stroke team     Accepting physician is  Dr Ivelisse Fields    I discussed the patient's findings and my recommendations with patient, family, nursing staff and Dr Arenas and Dr Ghada Virk MD  07/06/22  14:46 CDT

## 2022-07-06 NOTE — ED NOTES
Patient  arrival to ER.  reports that patient onset of symptoms to be 1210.  reports that patient was sitting at her work desk and slumped over. Dr. Arenas notified and to bedside.

## 2022-07-07 LAB
QT INTERVAL: 402 MS
QTC INTERVAL: 469 MS

## 2022-08-05 ENCOUNTER — OFFICE VISIT (OUTPATIENT)
Dept: CARDIOLOGY | Facility: CLINIC | Age: 72
End: 2022-08-05

## 2022-08-05 VITALS
OXYGEN SATURATION: 98 % | HEART RATE: 65 BPM | SYSTOLIC BLOOD PRESSURE: 130 MMHG | HEIGHT: 66 IN | WEIGHT: 129 LBS | DIASTOLIC BLOOD PRESSURE: 80 MMHG | BODY MASS INDEX: 20.73 KG/M2

## 2022-08-05 DIAGNOSIS — Z92.82 RECEIVED INTRAVENOUS TISSUE PLASMINOGEN ACTIVATOR (TPA) IN EMERGENCY DEPARTMENT: ICD-10-CM

## 2022-08-05 DIAGNOSIS — I48.0 PAROXYSMAL ATRIAL FIBRILLATION: ICD-10-CM

## 2022-08-05 DIAGNOSIS — E78.5 HYPERLIPIDEMIA LDL GOAL <70: ICD-10-CM

## 2022-08-05 DIAGNOSIS — I63.512 ACUTE ISCHEMIC LEFT MCA STROKE: Primary | ICD-10-CM

## 2022-08-05 PROCEDURE — 99204 OFFICE O/P NEW MOD 45 MIN: CPT | Performed by: INTERNAL MEDICINE

## 2022-08-05 NOTE — PROGRESS NOTES
Jesika Bowers  [id number]  1950  71 y.o.  female    Referring Provider: Mike Coyne MD    Reason for  Visit:  Initial visit    Recurrent cerebrovascular accident   After initial cerebrovascular accident had TPA  HRT was stopped and was thought Covid Vaccine   Was diagnosed with paroxysmal atrial fibrillation recently and started on Eliquis   No bleeding, excessive bruising,    Mild chronic exertional shortness of breath on exertion relieved with rest  No significant cough or wheezing    Recent  palpitations  No associated chest pain  No significant pedal edema    No fever or chills  No significant expectoration    No hemoptysis  No presyncope or syncope    Tolerating current medications well with no untoward side effects   Compliant with prescribed medication regimen. Tries to adhere to cardiac diet.     No bleeding, excessive bruising, gait instability or fall risks    Anxious     History of present illness:  Jesika Bowers is a 71 y.o. yo female with cerebrovascular accident  who presents today for   Chief Complaint   Patient presents with   • Establish Care     Recent stroke(s) ([place])   .    History  Past Medical History:   Diagnosis Date   • Stroke (HCC)    ,   Past Surgical History:   Procedure Laterality Date   • HYSTERECTOMY     • OOPHORECTOMY     ,   Family History   Problem Relation Age of Onset   • Breast cancer Maternal Aunt    • No Known Problems Mother    • No Known Problems Father    • No Known Problems Sister    • No Known Problems Brother    • No Known Problems Daughter    • No Known Problems Son    • No Known Problems Maternal Grandmother    • No Known Problems Paternal Grandmother    • No Known Problems Paternal Aunt    • No Known Problems Other    • BRCA 1/2 Neg Hx    • Colon cancer Neg Hx    • Endometrial cancer Neg Hx    • Ovarian cancer Neg Hx    ,   Social History     Tobacco Use   • Smoking status: Never Smoker   • Smokeless tobacco: Never Used   Substance Use Topics   •  "Alcohol use: Never   • Drug use: Never   ,     Medications  Current Outpatient Medications   Medication Sig Dispense Refill   • apixaban (ELIQUIS) 5 MG tablet tablet Take 5 mg by mouth 2 (Two) Times a Day.     • Cranberry 450 MG tablet      • metoprolol tartrate (LOPRESSOR) 25 MG tablet Take 6.25 mg by mouth.     • rosuvastatin (CRESTOR) 10 MG tablet Take 20 mg by mouth Daily.     • aspirin 81 MG EC tablet Take 81 mg by mouth Daily.     • Calcium 500 MG tablet Every 12 (Twelve) Hours.     • glucosamine-chondroitin 500-400 MG capsule capsule Take 1 capsule by mouth 3 (Three) Times a Day With Meals.     • multivitamin with minerals tablet tablet Take 1 tablet by mouth Daily.     • Omega-3 Fatty Acids (fish oil) 1000 MG capsule capsule Take  by mouth Daily With Breakfast.       No current facility-administered medications for this visit.       Allergies:  Gadolinium derivatives and Iodides    Review of Systems  Review of Systems   Constitutional: Negative.   HENT: Negative.    Eyes: Negative.    Cardiovascular: Positive for dyspnea on exertion and palpitations. Negative for chest pain, claudication, cyanosis, irregular heartbeat, leg swelling, near-syncope, orthopnea, paroxysmal nocturnal dyspnea and syncope.   Respiratory: Negative.    Endocrine: Negative.    Hematologic/Lymphatic: Negative.    Skin: Negative.    Gastrointestinal: Negative for anorexia.   Genitourinary: Negative.    Neurological: Negative.    Psychiatric/Behavioral: Negative.        Objective     Physical Exam:  /80   Pulse 65   Ht 167.6 cm (66\")   Wt 58.5 kg (129 lb)   SpO2 98%   BMI 20.82 kg/m²     Physical Exam  Constitutional:       Appearance: Normal appearance.   HENT:      Head: Normocephalic.   Eyes:      General: Lids are normal.   Neck:      Vascular: No carotid bruit.   Cardiovascular:      Rate and Rhythm: Regular rhythm.      Heart sounds: Normal heart sounds, S1 normal and S2 normal.    No systolic murmur is " present.  Pulmonary:      Effort: Pulmonary effort is normal.   Abdominal:      Palpations: Abdomen is soft.   Neurological:      Mental Status: She is alert.      Comments: Left sided weakness    Psychiatric:         Speech: Speech normal.         Behavior: Behavior normal.         Thought Content: Thought content normal.         Results Review:    Transthoracic echo (TTE) complete with or without contrast per protocol With Bubble Study    Anatomical Region Laterality Modality   -- -- Ultrasound       Narrative    This result has an attachment that is not available.   Normal left and right ventricular size and systolic function.   Mild tricuspid regurgitation.   No intracardiac shunt is evident by bubble study.          CT angiogram head with and without and neck with contrast    Anatomical Region Laterality Modality   Head and Neck -- Computed Tomography       Impression      1.  Nonfilling of the inferior segment of the right middle cerebral artery with patchy areas of narrowing in sylvian branches on the right.     2.  No intracranial aneurysm.     3.  Lobular contour of the distal internal carotid artery suggesting fibromuscular disease.     4.  Right MCA distribution evolving infarct. Old right frontal and left parietal infarcts.       Please see above comments in the findings section for further description.     Electronically signed by  Mitchell Silverman MD on 8/3/2022 4:10 PM    Narrative        HEAD CT, CT ANGIOGRAM HEAD W WO AND NECK W CONTRAST 8/3/2022 2:49 PM     HISTORY:  R53.1-Left-sided weakness  R53.1Left-sided weakness     COMPARISON:  7/6/2022     TECHNIQUE:   CT ANGIOGRAM HEAD W WO AND NECK W CONTRAST     CT dataset was obtained through the HEAD AND NECK. The study was performed with and without IV contrast. CTA of the head and neck was performed. Sagittal and coronal reformats were generated. MIP reconstructions were performed.     INTRAVENOUS CONTRAST ADMINISTRATION (mL Omnipaque 350): The  volume of contrast administered is available in the patient's medical record.     DOSE REPORT (Total DLP mGy*cm): 2063.70     FINDINGS:     HEAD:     Old infarct is again seen in the distribution of the right middle cerebral artery with mild cortical hypertrophy intensity on the right. Old infarcts are also seen in the right frontal and left parietal lobes. No evidence of acute hemorrhage is seen.   Mild parenchymal atrophy is apparent. There are no extra-axial fluid collections. No midline shift.         CT ANGIOGRAPHY HEAD:     The inferior branch cerebral artery does not fill. Several areas of mild to moderate narrowing are seen in sylvian branches on the right. No stenosis or irregularity is seen on the left anterior circulation. Posterior fossa circulation is unremarkable.   No aneurysms are seen.     CT ANGIOGRAPHY NECK:     The origins of the great vessels are widely patent.     The common carotid arteries are unremarkable bilaterally. No stenosis or irregularity is seen in the proximal internal carotid arteries. There is beading of the distal cervical internal carotid arteries left greater than right as noted previously. No   dissection is seen. Vertebral arteries, right dominant are unremarkable.     Dural sinuses are patent.     The lung apices and soft tissues of the neck  are unremarkable.     There is no acute bony abnormality.     Procedure Note    Mitchell Silverman MD - 08/03/2022   Formatting of this note might be different from the original.       HEAD CT, CT ANGIOGRAM HEAD W WO AND NECK W CONTRAST 8/3/2022 2:49 PM     HISTORY:  R53.1-Left-sided weakness  R53.1Left-sided weakness     COMPARISON:  7/6/2022     TECHNIQUE:   CT ANGIOGRAM HEAD W WO AND NECK W CONTRAST     CT dataset was obtained through the HEAD AND NECK. The study was performed with and without IV contrast. CTA of the head and neck was performed. Sagittal and coronal reformats were generated. MIP reconstructions were performed.      INTRAVENOUS CONTRAST ADMINISTRATION (mL Omnipaque 350): The volume of contrast administered is available in the patient's medical record.     DOSE REPORT (Total DLP mGy*cm): 2063.70     FINDINGS:     HEAD:     Old infarct is again seen in the distribution of the right middle cerebral artery with mild cortical hypertrophy intensity on the right. Old infarcts are also seen in the right frontal and left parietal lobes. No evidence of acute hemorrhage is seen.   Mild parenchymal atrophy is apparent. There are no extra-axial fluid collections. No midline shift.         CT ANGIOGRAPHY HEAD:     The inferior branch cerebral artery does not fill. Several areas of mild to moderate narrowing are seen in sylvian branches on the right. No stenosis or irregularity is seen on the left anterior circulation. Posterior fossa circulation is unremarkable.   No aneurysms are seen.     CT ANGIOGRAPHY NECK:     The origins of the great vessels are widely patent.     The common carotid arteries are unremarkable bilaterally. No stenosis or irregularity is seen in the proximal internal carotid arteries. There is beading of the distal cervical internal carotid arteries left greater than right as noted previously. No   dissection is seen. Vertebral arteries, right dominant are unremarkable.     Dural sinuses are patent.     The lung apices and soft tissues of the neck  are unremarkable.     There is no acute bony abnormality.     IMPRESSION:     1.  Nonfilling of the inferior segment of the right middle cerebral artery with patchy areas of narrowing in sylvian branches on the right.     2.  No intracranial aneurysm.     3.  Lobular contour of the distal internal carotid artery suggesting fibromuscular disease.     4.  Right MCA distribution evolving infarct. Old right frontal and left parietal infarcts.       Please see above comments in the findings section for further description.     Electronically signed by  Mitchell Silverman MD on  8/3/2022 4:10 PM  Exam End: 08/03/22  3:14 PM    Specimen Collected: 08/03/22  3:57 PM Last Resulted: 08/03/22  4:10 PM   Received From: [facility]  Result Received: 08/05/22  9:38 AM            Results for orders placed during the hospital encounter of 10/02/21    Adult Transthoracic Echo Complete W/ Cont if Necessary Per Protocol (With Agitated Saline)    Interpretation Summary  · Left ventricular ejection fraction appears to be 51 - 55%. Left ventricular systolic function is normal.  · Left ventricular diastolic function was normal.  · No evidence of a patent foramen ovale. No evidence of an atrial septal defect present. Saline test results are negative for right to left atrial level shunt.  · No evidence of pulmonary hypertension is present.  · No hemodynamically significant valvular disease.        ____________________________________________________________________________________________________________________________________________  Health maintenance and recommendations    Low salt/ HTN/ Heart healthy carbohydrate restricted cardiac diet   The patient is advised to reduce or avoid caffeine or other cardiac stimulants.   Minimize or avoid  NSAID-type medications      Monitor for any signs of bleeding including red or dark stools. Fall precautions.   Advised staying uptodate with immunizations per established standard guidelines.    Offered to give patient  a copy of my notes     Questions were encouraged, asked and answered to the patient's  understanding and satisfaction. Questions if any regarding current medications and side effects, need for refills and importance of compliance to medications stressed.    Reviewed available prior notes, consults, prior visits, laboratory findings, radiology and cardiology relevant reports. Updated chart as applicable. I have reviewed the patient's medical history in detail and updated the computerized patient record as relevant.      Updated  patient regarding any new or relevant abnormalities on review of records or any new findings on physical exam. Mentioned to patient about purpose of visit and desirable health short and long term goals and objectives.    Primary to monitor CBC CMP Lipid panel and TSH as applicable    ___________________________________________________________________________________________________________________________________________   Procedures    Assessment & Plan   Diagnoses and all orders for this visit:    1. Acute ischemic left MCA stroke (HCC) (Primary)    2. Hyperlipidemia LDL goal <70    3. Received intravenous tissue plasminogen activator (tPA) in emergency department    4. Paroxysmal atrial fibrillation (HCC)  -     Holter Monitor - 72 Hour Up To 15 Days; Future          Plan    Check BP and heart rates twice daily initially till blood pressures and heart rates under good control and then at least 3x / week,   If blood pressures continue to be well-controlled then can check week a month  at home and bring a recording for review next visit  If BP >130/85 or < 100/60 persistently over 3 reading 30 mins apart or if heart rates persistently above 100 bpm or less than 55 bpm call sooner for evaluation and advise     Orders Placed This Encounter   Procedures   • Holter Monitor - 72 Hour Up To 15 Days     Standing Status:   Future     Standing Expiration Date:   8/5/2023     Order Specific Question:   Reason for exam?     Answer:   Palpitations     Order Specific Question:   Release to patient     Answer:   Immediate     Order Specific Question:   How many days is the patient to wear the monitor?     Answer:   14      Patient expressed understanding  Encouraged and answered all questions   Discussed with the patient and all questioned fully answered. She will call me if any problems arise.   Discussed results of prior testing with patient : echo x 2   as well electrocardiogram from 7/6/22     Fall precautions  Continue  rehab     See Dr Betancur next visit   They are friends and same [employer]     Return in about 6 weeks (around 9/16/2022).

## 2022-09-19 ENCOUNTER — OFFICE VISIT (OUTPATIENT)
Dept: CARDIOLOGY | Facility: CLINIC | Age: 72
End: 2022-09-19

## 2022-09-19 VITALS
DIASTOLIC BLOOD PRESSURE: 78 MMHG | SYSTOLIC BLOOD PRESSURE: 128 MMHG | OXYGEN SATURATION: 98 % | WEIGHT: 130 LBS | HEIGHT: 66 IN | HEART RATE: 76 BPM | BODY MASS INDEX: 20.89 KG/M2

## 2022-09-19 DIAGNOSIS — I48.0 PAROXYSMAL ATRIAL FIBRILLATION: Primary | ICD-10-CM

## 2022-09-19 DIAGNOSIS — E78.5 HYPERLIPIDEMIA LDL GOAL <70: ICD-10-CM

## 2022-09-19 DIAGNOSIS — I63.512 ACUTE ISCHEMIC LEFT MCA STROKE: ICD-10-CM

## 2022-09-19 PROCEDURE — 99214 OFFICE O/P EST MOD 30 MIN: CPT | Performed by: INTERNAL MEDICINE

## 2022-09-19 PROCEDURE — 93000 ELECTROCARDIOGRAM COMPLETE: CPT | Performed by: INTERNAL MEDICINE

## 2022-09-19 NOTE — PROGRESS NOTES
Subjective:     Encounter Date:09/19/2022      Patient ID: Jesika Bowers is a 71 y.o. female with prior CVA/TIA, also with paroxysmal atrial fibrillation, now chronically anticoagulated, presenting today for follow-up.    Chief Complaint: Here today for follow-up, paroxysmal atrial fibrillation, prior stroke    History of Present Illness    This patient presents today for follow-up.  She has a history of prior TIA/CVA and also paroxysmal atrial fibrillation.  She is chronically anticoagulated with Eliquis.  She is tolerating this well.  No recurrent strokelike symptoms.  She does note residual left-sided weakness and numbness.  Overall, the symptoms have improved since her prior stroke.  She denies having palpitations or any knowledge of atrial fibrillation.  No lightheadedness, dizziness, syncope.  No chest pain.  Breathing is stable.  The patient remains physically active and is doing well with exercise.  No side effects from current medications include no significant bleeding on Eliquis.  She remains on low-dose beta-blocker and also statin therapy.  She is tolerating these well also.    Atrial Fibrillation  Presents for follow-up visit. Symptoms are negative for chest pain, dizziness, hemodynamic instability, palpitations, shortness of breath and syncope. The symptoms have been stable. Past medical history includes atrial fibrillation. There are no medication compliance problems.       Current Outpatient Medications:   •  apixaban (ELIQUIS) 5 MG tablet tablet, Take 5 mg by mouth 2 (Two) Times a Day., Disp: , Rfl:   •  Calcium 500 MG tablet, Every 12 (Twelve) Hours., Disp: , Rfl:   •  Cranberry 450 MG tablet, , Disp: , Rfl:   •  glucosamine-chondroitin 500-400 MG capsule capsule, Take 1 capsule by mouth 3 (Three) Times a Day With Meals., Disp: , Rfl:   •  metoprolol tartrate (LOPRESSOR) 25 MG tablet, Take 6.25 mg by mouth., Disp: , Rfl:   •  multivitamin with minerals tablet tablet, Take 1 tablet by mouth  Daily., Disp: , Rfl:   •  rosuvastatin (CRESTOR) 10 MG tablet, Take 20 mg by mouth Daily., Disp: , Rfl:   •  Omega-3 Fatty Acids (fish oil) 1000 MG capsule capsule, Take  by mouth Daily With Breakfast., Disp: , Rfl:     Allergies   Allergen Reactions   • Gadolinium Derivatives Anaphylaxis     Cardiac arrest per .   • Iodides Anaphylaxis and Other (See Comments)     Coded after receiving MRI contrast dye     Social History     Tobacco Use   • Smoking status: Never Smoker   • Smokeless tobacco: Never Used   Substance Use Topics   • Alcohol use: Never     Review of Systems   Constitutional: Negative for fever and weight loss.   Cardiovascular: Negative for chest pain, dyspnea on exertion, leg swelling, orthopnea, palpitations, paroxysmal nocturnal dyspnea and syncope.   Respiratory: Negative for cough, hemoptysis, shortness of breath and wheezing.    Hematologic/Lymphatic: Negative for adenopathy and bleeding problem.   Gastrointestinal: Negative for abdominal pain, hematemesis, hematochezia, melena, nausea and vomiting.   Genitourinary: Negative for hematuria.   Neurological: Positive for focal weakness and numbness. Negative for dizziness, headaches and loss of balance.       ECG 12 Lead    Date/Time: 9/19/2022 1:07 PM  Performed by: Prateek Betancur MD  Authorized by: Prateek Betancur MD   Comparison: compared with previous ECG from 7/6/2022  Similar to previous ECG  Rhythm: sinus rhythm  Rate: normal  BPM: 70  Conduction: conduction normal  QRS axis: normal  Other findings: non-specific ST-T wave changes    Clinical impression: non-specific ECG             Objective:     Vitals reviewed.   Constitutional:       General: Not in acute distress.     Appearance: Well-developed and not in distress.   Eyes:      Extraocular Movements: Extraocular movements intact.   HENT:      Head: Normocephalic and atraumatic.   Neck:      Thyroid: No thyroid mass.      Vascular: No JVD.   Pulmonary:      Effort:  "Pulmonary effort is normal.      Breath sounds: Normal breath sounds. No wheezing. No rhonchi. No rales.   Cardiovascular:      Normal rate. Regular rhythm.      Murmurs: There is no murmur.      No gallop.   Pulses:     Intact distal pulses.   Edema:     Peripheral edema absent.   Abdominal:      General: Bowel sounds are normal. There is no distension.      Palpations: Abdomen is soft.      Tenderness: There is no abdominal tenderness.   Skin:     General: Skin is warm and dry. There is no cyanosis.      Findings: No erythema or rash.   Neurological:      Mental Status: Alert and oriented to person, place, and time.      Cranial Nerves: No cranial nerve deficit.       /78   Pulse 76   Ht 167.6 cm (66\")   Wt 59 kg (130 lb)   SpO2 98%   BMI 20.98 kg/m²     Data/Lab Review:     14-day patch monitor on 8/5/2022: The predominant rhythm is sinus rhythm, average heart rate 72.  Overall burden of atrial fibrillation approximately 2%.  Rare PACs and PVCs.    Results for orders placed during the hospital encounter of 10/02/21    Adult Transthoracic Echo Complete W/ Cont if Necessary Per Protocol (With Agitated Saline)    Interpretation Summary  · Left ventricular ejection fraction appears to be 51 - 55%. Left ventricular systolic function is normal.  · Left ventricular diastolic function was normal.  · No evidence of a patent foramen ovale. No evidence of an atrial septal defect present. Saline test results are negative for right to left atrial level shunt.  · No evidence of pulmonary hypertension is present.  · No hemodynamically significant valvular disease.        Assessment:          Diagnosis Plan   1. Paroxysmal atrial fibrillation (HCC)  ECG 12 Lead   2. Acute ischemic left MCA stroke (HCC)     3. Hyperlipidemia LDL goal <70            Plan:       1.  Paroxysmal atrial fibrillation: Clinically stable at this time.  The patient has been asymptomatic to prior episodes of atrial fibrillation.  She does remain " appropriately anticoagulated.  She is tolerating Eliquis well.  She does continue metoprolol.  No changes in therapy at this time.    2.  Acute left MCA stroke: The patient has had prior strokes and TIAs.  She still has residual left-sided numbness and weakness but overall seems to be clinically improving.  Continue anticoagulation given atrial fibrillation.  Continue statin therapy.    3.  Mixed hyperlipidemia: The patient is on statin therapy.  She is tolerating this therapy well.    We will plan to see the patient back in 6 months unless otherwise needed sooner.

## 2022-09-30 ENCOUNTER — TRANSCRIBE ORDERS (OUTPATIENT)
Dept: ADMINISTRATIVE | Facility: HOSPITAL | Age: 72
End: 2022-09-30

## 2022-09-30 DIAGNOSIS — Z12.31 ENCOUNTER FOR SCREENING MAMMOGRAM FOR MALIGNANT NEOPLASM OF BREAST: Primary | ICD-10-CM

## 2022-11-18 ENCOUNTER — HOSPITAL ENCOUNTER (OUTPATIENT)
Dept: MAMMOGRAPHY | Facility: HOSPITAL | Age: 72
Discharge: HOME | End: 2022-11-18
Admitting: FAMILY MEDICINE

## 2022-11-18 DIAGNOSIS — Z12.31 ENCOUNTER FOR SCREENING MAMMOGRAM FOR MALIGNANT NEOPLASM OF BREAST: ICD-10-CM

## 2022-11-18 PROCEDURE — 77063 BREAST TOMOSYNTHESIS BI: CPT

## 2022-11-18 PROCEDURE — 77067 SCR MAMMO BI INCL CAD: CPT

## 2023-03-23 ENCOUNTER — OFFICE VISIT (OUTPATIENT)
Dept: CARDIOLOGY | Facility: CLINIC | Age: 73
End: 2023-03-23
Payer: MEDICARE

## 2023-03-23 VITALS
SYSTOLIC BLOOD PRESSURE: 134 MMHG | OXYGEN SATURATION: 100 % | HEIGHT: 66 IN | HEART RATE: 62 BPM | DIASTOLIC BLOOD PRESSURE: 80 MMHG | BODY MASS INDEX: 20.41 KG/M2 | WEIGHT: 127 LBS

## 2023-03-23 DIAGNOSIS — E78.5 HYPERLIPIDEMIA LDL GOAL <70: ICD-10-CM

## 2023-03-23 DIAGNOSIS — I48.0 PAROXYSMAL ATRIAL FIBRILLATION: Primary | ICD-10-CM

## 2023-03-23 PROCEDURE — 1160F RVW MEDS BY RX/DR IN RCRD: CPT | Performed by: INTERNAL MEDICINE

## 2023-03-23 PROCEDURE — 99214 OFFICE O/P EST MOD 30 MIN: CPT | Performed by: INTERNAL MEDICINE

## 2023-03-23 PROCEDURE — 1159F MED LIST DOCD IN RCRD: CPT | Performed by: INTERNAL MEDICINE

## 2023-03-23 PROCEDURE — 93000 ELECTROCARDIOGRAM COMPLETE: CPT | Performed by: INTERNAL MEDICINE

## 2023-03-23 NOTE — PROGRESS NOTES
Subjective:     Encounter Date:03/23/2023      Patient ID: Jesika Bowers is a 72 y.o. female with previous TIA/CVA, paroxysmal atrial fibrillation, now chronically anticoagulated, presenting today for follow-up.    Chief Complaint: Here for follow-up of atrial fibrillation    History of Present Illness    This patient presents today for follow-up of paroxysmal atrial fibrillation.  She has been stable and denies having any symptoms of atrial fibrillation.  She denies having palpitations, lightheadedness, dizziness, syncope.  No chest pain or shortness of breath.  No lower extremity edema.  She denies having any bleeding issues on Eliquis.  She has not experienced any falls.  She continues to regain strength after prior strokes.  Overall, she says that she seems to be doing well.  She does continue to also take Crestor for hyperlipidemia.      Current Outpatient Medications:   •  apixaban (ELIQUIS) 5 MG tablet tablet, Take 1 tablet by mouth 2 (Two) Times a Day., Disp: , Rfl:   •  Calcium 500 MG tablet, Every 12 (Twelve) Hours., Disp: , Rfl:   •  Cranberry 450 MG tablet, , Disp: , Rfl:   •  glucosamine-chondroitin 500-400 MG capsule capsule, Take 1 capsule by mouth 3 (Three) Times a Day With Meals., Disp: , Rfl:   •  metoprolol tartrate (LOPRESSOR) 25 MG tablet, Take 6.25 mg by mouth., Disp: , Rfl:   •  multivitamin with minerals tablet tablet, Take 1 tablet by mouth Daily., Disp: , Rfl:   •  rosuvastatin (CRESTOR) 10 MG tablet, Take 2 tablets by mouth Daily., Disp: , Rfl:     Allergies   Allergen Reactions   • Gadolinium Derivatives Anaphylaxis     Cardiac arrest per .   • Iodides Anaphylaxis and Other (See Comments)     Coded after receiving MRI contrast dye     Social History     Tobacco Use   • Smoking status: Never   • Smokeless tobacco: Never   Substance Use Topics   • Alcohol use: Never     Review of Systems   Constitutional: Negative for fever and weight loss.   Cardiovascular: Negative for chest  "pain, dyspnea on exertion, leg swelling, orthopnea, palpitations, paroxysmal nocturnal dyspnea and syncope.   Respiratory: Negative for cough, shortness of breath and wheezing.    Hematologic/Lymphatic: Negative for bleeding problem. Does not bruise/bleed easily.   Gastrointestinal: Negative for abdominal pain, hematemesis, hematochezia, melena, nausea and vomiting.   Neurological: Negative for dizziness, headaches and loss of balance.       ECG 12 Lead    Date/Time: 3/23/2023 8:52 AM  Performed by: Prateek Betancur MD  Authorized by: Prateek Betancur MD   Comparison: compared with previous ECG from 9/19/2022  Similar to previous ECG  Rhythm: sinus bradycardia  Rate: bradycardic  BPM: 57  Conduction: conduction normal  QRS axis: normal  Other findings: non-specific ST-T wave changes and poor R wave progression    Clinical impression: abnormal EKG             Objective:     Vitals reviewed.   Constitutional:       General: Not in acute distress.     Appearance: Well-developed and not in distress.   HENT:      Head: Normocephalic and atraumatic.   Pulmonary:      Effort: Pulmonary effort is normal.      Breath sounds: Normal breath sounds. No wheezing. No rhonchi. No rales.   Cardiovascular:      Bradycardia present. Regular rhythm.      Murmurs: There is no murmur.      No gallop.   Pulses:     Intact distal pulses.   Edema:     Peripheral edema absent.   Abdominal:      General: Bowel sounds are normal. There is no distension.      Palpations: Abdomen is soft.      Tenderness: There is no abdominal tenderness.   Skin:     General: Skin is warm and dry. There is no cyanosis.      Findings: No erythema or rash.   Neurological:      Mental Status: Alert and oriented to person, place, and time.      Cranial Nerves: No cranial nerve deficit.       /80   Pulse 62   Ht 167.6 cm (66\")   Wt 57.6 kg (127 lb)   SpO2 100%   BMI 20.50 kg/m²     Data/Lab Review:     I was able to review a lipid panel from " 1/11/2023 showing an LDL cholesterol of 72, HDL 74, triglycerides 91    Previous cardiac monitor from 8/5/2022: Sinus rhythm superdominant rhythm with 2% burden of atrial fibrillation.    Echocardiogram from 10/2/2021 shows ejection fraction of 51-55%.  No evidence of PFO.  No significant valvular disease.      Assessment:          Diagnosis Plan   1. Paroxysmal atrial fibrillation (HCC)  ECG 12 Lead      2. Hyperlipidemia LDL goal <70               Plan:       1.  Paroxysmal atrial fibrillation: The patient remains stable at this time and has not had any obvious recurrences of atrial fibrillation.  Given prior stroke and elevated risk profile, she remains anticoagulated with Eliquis.  So far, she is tolerating this medication well.  No changes recommended at this time.    [id number] SCORE   [id number] Score: 4 (3/23/2023  8:52 AM)     2.  Hyperlipidemia: The patient remains on statin therapy.  The patient's lipids are followed by her primary care provider.  Lipids are well controlled based on the most recent lipid panel, referenced above.     We will see the patient again in 6 months unless otherwise needed sooner.

## 2023-04-21 ENCOUNTER — TELEPHONE (OUTPATIENT)
Dept: NEUROLOGY | Facility: CLINIC | Age: 73
End: 2023-04-21

## 2023-04-21 NOTE — TELEPHONE ENCOUNTER
The [facility] received a fax that requires your attention. The document has been indexed to the patient’s chart for your review.      Reason for sending: FYI    Documents Description: PHY LETTER AND PROGRESS NOTE    Name of Sender: MADDY FERNANDES    Date Indexed: 04/21/23    Notes (if needed):

## 2023-07-21 NOTE — PROGRESS NOTES
AUDIOMETRIC EVALUATION      Name:  Jesika Bowers  :  1950  Age:  72 y.o.  Date of Evaluation:  2023       History:  Reason for visit:  Ms. Bowers is seen today at the request of MEGA Egan for a hearing evaluation. Patient was seen by ENT provider on 2023 for cerumen impaction and tinnitus. Patient is here with her  today. She reports she has had hearing loss for years. Her  explained she had a sudden hearing loss of the right ear about 30 years ago, which she was followed in [facility]. He states they think it was from a mini stroke she had. She states her last hearing test was about a year ago at [place]. We do not have the results for review. She has never worn hearing aids.     PE Tubes:  no, both ears   Other otologic surgical history: no, both ears  Tinnitus:  yes, constant ringing in both ears and occasional chirping in right ear.  Dizziness:  no  Noise Exposure: no  Aural Fullness:  yes, both ears  Otalgia: no, both ears  Family history of hearing loss: brother  Other significant history:  atrial fibrillation,  stroke  and , mini stroke 30 years ago  Head trauma requiring hospital stay: no  Previous brain MRI: yes,       EVALUATION:        RESULTS:    Otoscopic Evaluation:  Bilateral: minimal cerumen, tympanic membrane visualized     Tympanometry (226 Hz):  Bilateral: Type A- normal    Pure Tone Audiometry (via inserts with good reliability):    Right: profound relatively flat sensorineural hearing loss  Left: normal through 4kHz, sloping to moderate high frequency sensorineural hearing loss    Speech Audiometry:   Right: Speech Awareness Threshold (SAT) was observed at 90 dBHL with 60dB masking in opposite ear  Word Recognition scores-  0% (very poor, <52%), with no response  Presented at 100dBHL with 70dB of masking in opposite ear  Using NU-6 List 4A, 25 words  Left: Speech Reception Threshold (SRT) was obtained at 25 dBHL  Word Recognition scores-   96% (excellent/within normal limits, %)   Presented at 65dBHL   Using NU-6 List 4A, 25 words    IMPRESSIONS:  Tympanometry showed normal middle ear pressure and static compliance, for both ears. Pure tone thresholds for the right ear show a profound relatively flat sensorineural hearing loss and the left ear shows a normal sloping to moderate high frequency sensorineural hearing loss. Results suggest normal outer/middle ear function and abnormal cochlear/retrocochlear function, bilaterally. Asymmetry present with the right ear significantly worse than the left. Patient and patient's  were counseled with regard to the findings.    Amplification needs:  Patient may benefit from a BiCROs, a Baha device on the right side, or cochlear implant on the right side. Patient has had this hearing loss for 30 years, so she may not benefit as much from a cochlear implant as she would a BiCROS    Diagnosis:  1. Asymmetrical sensorineural hearing loss    2. Tinnitus of both ears         RECOMMENDATIONS/PLAN:  Follow-up recommendations per MEGA Egan    Audiologic follow-up in 1 year  Return for audiologic testing if noticing changes in hearing or concerns arise  Hearing aid evaluation and counseling upon medical clearance and patient motivation  Baha counseling upon medical clearance and patient motivation  Cochlear implant evaluation and counseling upon medical clearance and patient motivation  Use communication strategies  Use hearing protection around loud noises  Avoid silence when possible. Sleep with white noise/fan, or listen to nature sounds     EDUCATION:  Discussed results and recommendations with patient. Questions were addressed and the patient was encouraged to contact our department should concerns arise.        Edel Johnson [facility]  Licensed Audiologist

## 2023-07-24 ENCOUNTER — PROCEDURE VISIT (OUTPATIENT)
Dept: OTOLARYNGOLOGY | Facility: CLINIC | Age: 73
End: 2023-07-24
Payer: MEDICARE

## 2023-07-24 ENCOUNTER — OFFICE VISIT (OUTPATIENT)
Dept: OTOLARYNGOLOGY | Facility: CLINIC | Age: 73
End: 2023-07-24
Payer: MEDICARE

## 2023-07-24 VITALS
SYSTOLIC BLOOD PRESSURE: 186 MMHG | WEIGHT: 131 LBS | RESPIRATION RATE: 16 BRPM | DIASTOLIC BLOOD PRESSURE: 93 MMHG | BODY MASS INDEX: 20.56 KG/M2 | HEART RATE: 61 BPM | TEMPERATURE: 97.5 F | HEIGHT: 67 IN

## 2023-07-24 DIAGNOSIS — H90.3 ASYMMETRICAL SENSORINEURAL HEARING LOSS: Primary | ICD-10-CM

## 2023-07-24 DIAGNOSIS — H93.13 TINNITUS OF BOTH EARS: ICD-10-CM

## 2023-07-24 DIAGNOSIS — J34.89 RHINORRHEA: ICD-10-CM

## 2023-07-24 DIAGNOSIS — H91.8X9 ASYMMETRICAL HEARING LOSS: ICD-10-CM

## 2023-07-24 DIAGNOSIS — R09.82 POST-NASAL DRAINAGE: ICD-10-CM

## 2023-07-24 DIAGNOSIS — H93.13 TINNITUS OF BOTH EARS: Primary | ICD-10-CM

## 2023-07-24 DIAGNOSIS — H93.93 UNSPECIFIED DISORDER OF EAR, BILATERAL: ICD-10-CM

## 2023-07-24 PROCEDURE — 92567 TYMPANOMETRY: CPT

## 2023-07-24 PROCEDURE — 92557 COMPREHENSIVE HEARING TEST: CPT

## 2023-07-24 PROCEDURE — 99213 OFFICE O/P EST LOW 20 MIN: CPT | Performed by: NURSE PRACTITIONER

## 2023-07-24 PROCEDURE — 1160F RVW MEDS BY RX/DR IN RCRD: CPT | Performed by: NURSE PRACTITIONER

## 2023-07-24 PROCEDURE — 1159F MED LIST DOCD IN RCRD: CPT | Performed by: NURSE PRACTITIONER

## 2023-07-24 RX ORDER — AZELASTINE 1 MG/ML
2 SPRAY, METERED NASAL 2 TIMES DAILY
Qty: 30 ML | Refills: 11 | Status: SHIPPED | OUTPATIENT
Start: 2023-07-24

## 2023-07-24 NOTE — PATIENT INSTRUCTIONS
Hearing test in one year  BiCROS/CROS hearing aids recommended  Astelin and flonase as directed  Return for problems    CONTACT INFORMATION:  The main office phone number is [phone]. For emergencies after hours and on weekends, this number will convert over to our answering service and the on call provider will answer. Please try to keep non emergent phone calls/ questions to office hours 9am-5pm Monday through Friday.      Orgger  As an alternative, you can sign up and use the Epic MyChart system for more direct and quicker access for non emergent questions/ problems.  Medium allows you to send messages to your doctor, view your test results, renew your prescriptions, schedule appointments, and more. To sign up, go to Acorio and click on the Sign Up Now link in the New User? box. Enter your Orgger Activation Code exactly as it appears below along with the last four digits of your Social Security Number and your Date of Birth () to complete the sign-up process. If you do not sign up before the expiration date, you must request a new code.     Orgger Activation Code: Activation code not generated  Current Orgger Status: Active     If you have questions, you can email [email or web address] or call [phone] to talk to our Orgger staff. Remember, Orgger is NOT to be used for urgent needs. For medical emergencies, dial 911.     IF YOU SMOKE OR USE TOBACCO PLEASE READ THE FOLLOWING:  Why is smoking bad for me?  Smoking increases the risk of heart disease, lung disease, vascular disease, stroke, and cancer. If you smoke, STOP!        IF YOU SMOKE OR USE TOBACCO PLEASE READ THE FOLLOWING:  Why is smoking bad for me?  Smoking increases the risk of heart disease, lung disease, vascular disease, stroke, and cancer. If you smoke, STOP!     For more information:  Quit Now [place]  -QUIT-NOW  https://[email or web address].[email or web address]/en-[place]/

## 2023-07-24 NOTE — PROGRESS NOTES
YOB: 1950  Location: [facility]  Location Address: [address] [place] [address], [facility], [address] [place] [address]  Location Phone: [phone]    Chief Complaint   Patient presents with    Hearing Loss    Tinnitus       History of Present Illness  Jesika Bowers is a 72 y.o. female.  Jesika Bowers is here for follow up of ENT complaints. The patient has had problems with tinnitus of both ears. She has a history of sudden hearing loss in the right ear 30 years ago post stroke. Today, she also reports post nasal drip and rhinorrhea. She has used Flonase for 3-4 days.      Past Medical History:   Diagnosis Date    A-fib     Stroke        Past Surgical History:   Procedure Laterality Date    HYSTERECTOMY      OOPHORECTOMY         Outpatient Medications Marked as Taking for the 23 encounter (Office Visit) with Froylan Smith APRN   Medication Sig Dispense Refill    apixaban (ELIQUIS) 5 MG tablet tablet Take 1 tablet by mouth 2 (Two) Times a Day.      Calcium 500 MG tablet Every 12 (Twelve) Hours.      Cranberry 450 MG tablet       glucosamine-chondroitin 500-400 MG capsule capsule Take 1 capsule by mouth 3 (Three) Times a Day With Meals.      metoprolol tartrate (LOPRESSOR) 25 MG tablet Take 6.25 mg by mouth.      multivitamin with minerals tablet tablet Take 1 tablet by mouth Daily.      rosuvastatin (CRESTOR) 10 MG tablet Take 2 tablets by mouth Daily.         Gadolinium derivatives and Iodides    Family History   Problem Relation Age of Onset    Breast cancer Maternal Aunt     No Known Problems Mother     No Known Problems Father     No Known Problems Sister     No Known Problems Brother     No Known Problems Daughter     No Known Problems Son     No Known Problems Maternal Grandmother     No Known Problems Paternal Grandmother     No Known Problems Paternal Aunt     No Known Problems Other     BRCA 1/2 Neg Hx     Colon cancer Neg Hx     Endometrial cancer Neg Hx     Ovarian cancer Neg Hx        Social  History     Socioeconomic History    Marital status:    Tobacco Use    Smoking status: Never    Smokeless tobacco: Never   Vaping Use    Vaping Use: Never used   Substance and Sexual Activity    Alcohol use: Never    Drug use: Never    Sexual activity: Defer       Review of Systems   Constitutional: Negative.    HENT:  Positive for hearing loss, postnasal drip, rhinorrhea and tinnitus.    Respiratory: Negative.     Cardiovascular: Negative.    Genitourinary: Negative.      Vitals:    07/24/23 0924   BP: (!) 186/93   Pulse: 61   Resp: 16   Temp: 97.5 °F (36.4 °C)       Body mass index is 20.83 kg/m².    Objective     Physical Exam  Vitals reviewed.   Constitutional:       Appearance: Normal appearance.   HENT:      Head: Normocephalic.      Right Ear: Tympanic membrane, ear canal and external ear normal. Decreased hearing noted.      Left Ear: Tympanic membrane, ear canal and external ear normal.      Nose: Rhinorrhea present. Rhinorrhea is clear.      Mouth/Throat:      Lips: Pink.      Mouth: Mucous membranes are moist.   Musculoskeletal:      Cervical back: Full passive range of motion without pain.   Neurological:      Mental Status: She is alert.   Psychiatric:         Behavior: Behavior is cooperative.       Assessment & Plan   Diagnoses and all orders for this visit:    1. Tinnitus of both ears (Primary)  -     Comprehensive Hearing Test; Future    2. Asymmetrical hearing loss  -     Comprehensive Hearing Test; Future    3. Post-nasal drainage    4. Rhinorrhea    5. Unspecified disorder of ear, bilateral  -     Comprehensive Hearing Test; Future    Other orders  -     azelastine (ASTELIN) 0.1 % nasal spray; 2 sprays into the nostril(s) as directed by provider 2 (Two) Times a Day. Use in each nostril as directed  Dispense: 30 mL; Refill: 11      * Surgery not found *  Orders Placed This Encounter   Procedures    Comprehensive Hearing Test     Standing Status:   Future     Order Specific Question:    Laterality     Answer:   Bilateral     Order Specific Question:   Release to patient     Answer:   Routine Release     Hearing test in one year  BiCROS/CROS hearing aids recommended  Astelin and flonase as directed  Return for problems    Return in about 1 year (around 2024) for Recheck, with audio.       Patient Instructions   Hearing test in one year  BiCROS/CROS hearing aids recommended  Astelin and flonase as directed  Return for problems    CONTACT INFORMATION:  The main office phone number is [phone]. For emergencies after hours and on weekends, this number will convert over to our answering service and the on call provider will answer. Please try to keep non emergent phone calls/ questions to office hours 9am-5pm Monday through Friday.      Xercise4less  As an alternative, you can sign up and use the Epic MyChart system for more direct and quicker access for non emergent questions/ problems.  Relevant e-solution allows you to send messages to your doctor, view your test results, renew your prescriptions, schedule appointments, and more. To sign up, go to Instabeat and click on the Sign Up Now link in the New User? box. Enter your Xercise4less Activation Code exactly as it appears below along with the last four digits of your Social Security Number and your Date of Birth () to complete the sign-up process. If you do not sign up before the expiration date, you must request a new code.     Xercise4less Activation Code: Activation code not generated  Current Xercise4less Status: Active     If you have questions, you can email [email or web address] or call [phone] to talk to our Xercise4less staff. Remember, Xercise4less is NOT to be used for urgent needs. For medical emergencies, dial 911.     IF YOU SMOKE OR USE TOBACCO PLEASE READ THE FOLLOWING:  Why is smoking bad for me?  Smoking increases the risk of heart disease, lung disease, vascular disease, stroke, and cancer. If you smoke, STOP!         IF YOU SMOKE OR USE TOBACCO PLEASE READ THE FOLLOWING:  Why is smoking bad for me?  Smoking increases the risk of heart disease, lung disease, vascular disease, stroke, and cancer. If you smoke, STOP!     For more information:  Quit Now [place]  1-800-QUIT-NOW  https://[email or web address].quitlogix.org/en-US/

## 2023-09-26 ENCOUNTER — TRANSCRIBE ORDERS (OUTPATIENT)
Dept: ADMINISTRATIVE | Facility: HOSPITAL | Age: 73
End: 2023-09-26
Payer: MEDICARE

## 2023-09-26 DIAGNOSIS — Z12.31 BREAST CANCER SCREENING BY MAMMOGRAM: Primary | ICD-10-CM

## 2023-09-28 ENCOUNTER — OFFICE VISIT (OUTPATIENT)
Dept: CARDIOLOGY | Facility: CLINIC | Age: 73
End: 2023-09-28
Payer: MEDICARE

## 2023-09-28 VITALS
HEIGHT: 67 IN | WEIGHT: 135 LBS | SYSTOLIC BLOOD PRESSURE: 146 MMHG | HEART RATE: 75 BPM | DIASTOLIC BLOOD PRESSURE: 80 MMHG | OXYGEN SATURATION: 99 % | BODY MASS INDEX: 21.19 KG/M2

## 2023-09-28 DIAGNOSIS — E78.5 HYPERLIPIDEMIA LDL GOAL <70: ICD-10-CM

## 2023-09-28 DIAGNOSIS — I48.0 PAROXYSMAL ATRIAL FIBRILLATION: Primary | ICD-10-CM

## 2023-09-28 PROCEDURE — 1160F RVW MEDS BY RX/DR IN RCRD: CPT | Performed by: INTERNAL MEDICINE

## 2023-09-28 PROCEDURE — 93000 ELECTROCARDIOGRAM COMPLETE: CPT | Performed by: INTERNAL MEDICINE

## 2023-09-28 PROCEDURE — 1159F MED LIST DOCD IN RCRD: CPT | Performed by: INTERNAL MEDICINE

## 2023-09-28 PROCEDURE — 99214 OFFICE O/P EST MOD 30 MIN: CPT | Performed by: INTERNAL MEDICINE

## 2023-09-28 NOTE — PROGRESS NOTES
Subjective:     Encounter Date:09/28/2023      Patient ID: Jesika Bowers is a 72 y.o. female with previous TIA/CVA, paroxysmal atrial fibrillation, now chronically anticoagulated, presenting today for follow-up.     Chief Complaint: Here for routine follow-up of atrial fibrillation    History of Present Illness    This patient presents today for follow-up of atrial fibrillation.  She has a history of paroxysmal atrial fibrillation with prior TIA/CVA.  She continues to recover from her most recent neurologic event and seems to be improving.  She denies having any knowledge of atrial fibrillation with no reported palpitations.  No lightheadedness, dizziness, syncope.  She is trying to remain active.  She denies having any symptoms with activity in particular.  Her gait is still somewhat unsteady but she has not had any recent falls.  She remains on Eliquis and is tolerating the medication well with no significant bleeding issues.  She is also on metoprolol, Crestor.  She is tolerating these medications well.      Current Outpatient Medications:     apixaban (ELIQUIS) 5 MG tablet tablet, Take 1 tablet by mouth 2 (Two) Times a Day., Disp: , Rfl:     Calcium 500 MG tablet, Every 12 (Twelve) Hours., Disp: , Rfl:     Cranberry 450 MG tablet, , Disp: , Rfl:     glucosamine-chondroitin 500-400 MG capsule capsule, Take 1 capsule by mouth 3 (Three) Times a Day With Meals., Disp: , Rfl:     metoprolol tartrate (LOPRESSOR) 25 MG tablet, Take 0.25 tablets by mouth 2 (Two) Times a Day., Disp: , Rfl:     multivitamin with minerals tablet tablet, Take 1 tablet by mouth Daily., Disp: , Rfl:     rosuvastatin (CRESTOR) 10 MG tablet, Take 2 tablets by mouth Daily., Disp: , Rfl:     metoprolol tartrate (LOPRESSOR) 25 MG tablet, Take 6.25 mg by mouth., Disp: , Rfl:     Allergies   Allergen Reactions    Gadolinium Derivatives Anaphylaxis     Cardiac arrest per .    Iodides Anaphylaxis and Other (See Comments)     Coded after  receiving MRI contrast dye     Social History     Tobacco Use    Smoking status: Never    Smokeless tobacco: Never   Substance Use Topics    Alcohol use: Never     Review of Systems   Constitutional: Negative for fever and weight loss.   Cardiovascular:  Negative for chest pain, dyspnea on exertion, leg swelling, orthopnea, palpitations, paroxysmal nocturnal dyspnea and syncope.   Respiratory:  Negative for cough, hemoptysis, shortness of breath and wheezing.    Hematologic/Lymphatic: Negative for bleeding problem.   Gastrointestinal:  Negative for abdominal pain, hematemesis, hematochezia, melena, nausea and vomiting.   Neurological:  Negative for dizziness, headaches and loss of balance.       ECG 12 Lead    Date/Time: 9/28/2023 8:53 AM  Performed by: Prateek Betancur MD  Authorized by: Prateek Betancur MD   Comparison: compared with previous ECG from 3/23/2023  Similar to previous ECG  Rhythm: sinus rhythm  Rate: normal  BPM: 63  Conduction: conduction normal  QRS axis: normal  Other findings: non-specific ST-T wave changes    Clinical impression: non-specific ECG           Objective:     Vitals reviewed.   Constitutional:       General: Not in acute distress.     Appearance: Well-developed and not in distress.   Eyes:      Extraocular Movements: Extraocular movements intact.   HENT:      Head: Normocephalic and atraumatic.   Pulmonary:      Effort: Pulmonary effort is normal.      Breath sounds: Normal breath sounds. No wheezing. No rhonchi. No rales.   Cardiovascular:      Normal rate. Regular rhythm.      Murmurs: There is no murmur.      No gallop.    Pulses:     Intact distal pulses.   Edema:     Peripheral edema absent.   Abdominal:      General: Bowel sounds are normal. There is no distension.      Palpations: Abdomen is soft.      Tenderness: There is no abdominal tenderness.   Skin:     General: Skin is warm and dry. There is no cyanosis.      Findings: No erythema or rash.   Neurological:  "     Mental Status: Alert and oriented to person, place, and time.      Cranial Nerves: No cranial nerve deficit.     /80   Pulse 75   Ht 168.9 cm (66.5\")   Wt 61.2 kg (135 lb)   SpO2 99%   BMI 21.46 kg/m²     Data/Lab Review:     Lipid panel in January 2023: LDL 72, HDL 34, triglycerides 91    Echocardiogram at [facility] on 7/7/2022: Normal left and right ventricular size and systolic function noted.  No significant valvular abnormalities appreciated.  No intracardiac shunt noted by bubble study.      Assessment:          Diagnosis Plan   1. Paroxysmal atrial fibrillation  ECG 12 Lead      2. Hyperlipidemia LDL goal <70             Plan:       1.  Paroxysmal atrial fibrillation: Stable at this time.  No symptoms of atrial fibrillation.  Given the patient's elevated risk profile, she remains anticoagulated with Eliquis.  She has not experienced any significant side effects from the medication.  No changes would be recommended today.     [id number] SCORE   [id number] Score: 4 (9/28/2023  8:54 AM)    2.  Hyperlipidemia: Lipids are followed by the patient's primary care provider.  Most recent lipid panel that I can see is referenced above showing an LDL cholesterol of 72.  Continue statin therapy.    We will see the patient again in 1 year unless otherwise needed sooner.       "

## 2023-11-15 LAB
NCCN CRITERIA FLAG: ABNORMAL
TYRER CUZICK SCORE: 8.1

## 2023-11-20 ENCOUNTER — HOSPITAL ENCOUNTER (OUTPATIENT)
Dept: MAMMOGRAPHY | Facility: HOSPITAL | Age: 73
Discharge: HOME | End: 2023-11-20
Admitting: FAMILY MEDICINE
Payer: MEDICARE

## 2023-11-20 DIAGNOSIS — Z12.31 BREAST CANCER SCREENING BY MAMMOGRAM: ICD-10-CM

## 2023-11-20 PROCEDURE — 77063 BREAST TOMOSYNTHESIS BI: CPT

## 2023-11-20 PROCEDURE — 77067 SCR MAMMO BI INCL CAD: CPT

## 2024-03-22 ENCOUNTER — TRANSCRIBE ORDERS (OUTPATIENT)
Dept: ADMINISTRATIVE | Facility: HOSPITAL | Age: 74
End: 2024-03-22
Payer: MEDICARE

## 2024-03-22 DIAGNOSIS — R05.9 COUGH, UNSPECIFIED TYPE: ICD-10-CM

## 2024-03-22 DIAGNOSIS — I63.9 ISCHEMIC STROKE: Primary | ICD-10-CM

## 2024-05-30 ENCOUNTER — OFFICE VISIT (OUTPATIENT)
Dept: ENT CLINIC | Age: 74
End: 2024-05-30
Payer: MEDICARE

## 2024-05-30 VITALS
BODY MASS INDEX: 22.91 KG/M2 | DIASTOLIC BLOOD PRESSURE: 74 MMHG | SYSTOLIC BLOOD PRESSURE: 130 MMHG | WEIGHT: 146 LBS | HEIGHT: 67 IN

## 2024-05-30 DIAGNOSIS — H61.21 IMPACTED CERUMEN OF RIGHT EAR: ICD-10-CM

## 2024-05-30 DIAGNOSIS — H65.92 MEE (MIDDLE EAR EFFUSION), LEFT: Primary | ICD-10-CM

## 2024-05-30 PROCEDURE — G8427 DOCREV CUR MEDS BY ELIG CLIN: HCPCS | Performed by: PHYSICIAN ASSISTANT

## 2024-05-30 PROCEDURE — 69210 REMOVE IMPACTED EAR WAX UNI: CPT | Performed by: PHYSICIAN ASSISTANT

## 2024-05-30 PROCEDURE — 4004F PT TOBACCO SCREEN RCVD TLK: CPT | Performed by: PHYSICIAN ASSISTANT

## 2024-05-30 PROCEDURE — 1123F ACP DISCUSS/DSCN MKR DOCD: CPT | Performed by: PHYSICIAN ASSISTANT

## 2024-05-30 PROCEDURE — 3017F COLORECTAL CA SCREEN DOC REV: CPT | Performed by: PHYSICIAN ASSISTANT

## 2024-05-30 PROCEDURE — 99203 OFFICE O/P NEW LOW 30 MIN: CPT | Performed by: PHYSICIAN ASSISTANT

## 2024-05-30 PROCEDURE — 1090F PRES/ABSN URINE INCON ASSESS: CPT | Performed by: PHYSICIAN ASSISTANT

## 2024-05-30 PROCEDURE — G8399 PT W/DXA RESULTS DOCUMENT: HCPCS | Performed by: PHYSICIAN ASSISTANT

## 2024-05-30 PROCEDURE — G8420 CALC BMI NORM PARAMETERS: HCPCS | Performed by: PHYSICIAN ASSISTANT

## 2024-05-30 RX ORDER — OXYMETAZOLINE HYDROCHLORIDE 0.05 G/100ML
SPRAY NASAL
Qty: 30 ML | Refills: 0 | Status: SHIPPED | OUTPATIENT
Start: 2024-05-30

## 2024-05-30 RX ORDER — PREDNISONE 10 MG/1
TABLET ORAL
Qty: 10 TABLET | Refills: 0 | Status: SHIPPED | OUTPATIENT
Start: 2024-05-30

## 2024-05-30 RX ORDER — ECHINACEA PURPUREA EXTRACT 125 MG
2 TABLET ORAL
Qty: 1 EACH | Refills: 3 | Status: SHIPPED | OUTPATIENT
Start: 2024-05-30

## 2024-05-30 ASSESSMENT — ENCOUNTER SYMPTOMS
SINUS PAIN: 0
SINUS PRESSURE: 1
EYE DISCHARGE: 0
SORE THROAT: 0
VOICE CHANGE: 0
FACIAL SWELLING: 0
RHINORRHEA: 0
TROUBLE SWALLOWING: 0
EYE PAIN: 0

## 2024-05-30 NOTE — PROGRESS NOTES
[facility] OTOLARYNGOLOGY/ENT  Mrs. Salazar is a pleasant 73-year-old  female that was referred by Clare Garcia due to problems with a cerumen impaction to the right ear with a left middle ear effusion.  Patient reports that she has noticed gradual diminished hearing that is more prominent to the left side.  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  This has affected her left side.  She also has a history of having a brain injury in the remote past that has affected the hearing from her right ear.  Currently she denies any drainage from the ear canals or any issues with fever or chills.  Of note, this is  Loki Salazar's wife.         Allergies: Dye [iodides]      Current Outpatient Medications   Medication Sig Dispense Refill    predniSONE (DELTASONE) 10 MG tablet 2 tabs po q d x 3 days, then 1 tab po q d x 2 days, and 1/2 tab po q d x 2 days 10 tablet 0    oxymetazoline (AFRIN) 0.05 % nasal spray 2 squirts to each nostril twice a day x 7 days 30 mL 0    sodium chloride (OCEAN NASAL SPRAY) 0.65 % nasal spray 2 sprays by Nasal route nightly 1 each 3    Multiple Vitamin (MULTIVITAMIN ADULT PO) Take by mouth      Omega-3 Fatty Acids (FISH OIL) 1000 MG CAPS Take 1,000 mg by mouth daily      estradiol (ESTRACE) 2 MG tablet Take 1 tablet by mouth daily 90 tablet 3    Glucosamine 500 MG CAPS Take by mouth       No current facility-administered medications for this visit.       Past Surgical History:   Procedure Laterality Date    HYSTERECTOMY, VAGINAL      and BSO       No past medical history on file.    Family History   Problem Relation Age of Onset    Cancer Mother         ovarian    Cancer Maternal Aunt         breast       Social History     Tobacco Use    Smoking status: Never    Smokeless tobacco: Never   Substance Use Topics    Alcohol use: Never           REVIEW OF SYSTEMS:  all other systems reviewed and are negative  Review of Systems   Constitutional:  Negative for chills

## 2024-05-30 NOTE — ASSESSMENT & PLAN NOTE
Left middle ear effusion-confirmed by microscopy  Plan: Due to the patient recently completing 2 rounds of antibiotics, I recommended placing the patient on prednisone, Afrin nasal spray, and Ocean nasal spray nightly.  She is to follow-up in 2 weeks for reevaluation of the left ear.  She was reminded to call if she has any questions or concerns.

## 2024-05-30 NOTE — ASSESSMENT & PLAN NOTE
Cerumen impaction of the right ear-successfully removed with microscopic guidance and instrumentation  Plan: Due to the patient's frequent history of cerumen impactions, I recommended a follow-up in 6 months for cerumen check.

## 2024-06-10 ENCOUNTER — OFFICE VISIT (OUTPATIENT)
Dept: ENT CLINIC | Age: 74
End: 2024-06-10
Payer: MEDICARE

## 2024-06-10 VITALS
SYSTOLIC BLOOD PRESSURE: 128 MMHG | WEIGHT: 145 LBS | BODY MASS INDEX: 22.76 KG/M2 | DIASTOLIC BLOOD PRESSURE: 66 MMHG | HEIGHT: 67 IN

## 2024-06-10 DIAGNOSIS — H65.93 MEE (MIDDLE EAR EFFUSION), BILATERAL: Primary | ICD-10-CM

## 2024-06-10 PROCEDURE — 1123F ACP DISCUSS/DSCN MKR DOCD: CPT | Performed by: PHYSICIAN ASSISTANT

## 2024-06-10 PROCEDURE — 1090F PRES/ABSN URINE INCON ASSESS: CPT | Performed by: PHYSICIAN ASSISTANT

## 2024-06-10 PROCEDURE — G8427 DOCREV CUR MEDS BY ELIG CLIN: HCPCS | Performed by: PHYSICIAN ASSISTANT

## 2024-06-10 PROCEDURE — 3017F COLORECTAL CA SCREEN DOC REV: CPT | Performed by: PHYSICIAN ASSISTANT

## 2024-06-10 PROCEDURE — G8399 PT W/DXA RESULTS DOCUMENT: HCPCS | Performed by: PHYSICIAN ASSISTANT

## 2024-06-10 PROCEDURE — 99213 OFFICE O/P EST LOW 20 MIN: CPT | Performed by: PHYSICIAN ASSISTANT

## 2024-06-10 PROCEDURE — G8420 CALC BMI NORM PARAMETERS: HCPCS | Performed by: PHYSICIAN ASSISTANT

## 2024-06-10 PROCEDURE — 4004F PT TOBACCO SCREEN RCVD TLK: CPT | Performed by: PHYSICIAN ASSISTANT

## 2024-06-10 RX ORDER — OXYMETAZOLINE HYDROCHLORIDE 0.05 G/100ML
SPRAY NASAL
Qty: 30 ML | Refills: 0 | Status: SHIPPED | OUTPATIENT
Start: 2024-06-10

## 2024-06-10 RX ORDER — PREDNISONE 20 MG/1
TABLET ORAL
Qty: 20 TABLET | Refills: 0 | Status: SHIPPED | OUTPATIENT
Start: 2024-06-10

## 2024-06-10 RX ORDER — LOSARTAN POTASSIUM 100 MG/1
1 TABLET ORAL
COMMUNITY
Start: 2024-05-16 | End: 2024-08-14

## 2024-06-10 NOTE — PROGRESS NOTES
Ms. Salazar is a very pleasant 73-year-old  female that presents for a 2-week follow-up after treatment for a left middle ear effusion.  Patient reports that she tolerated the lower dose prednisone with no issues but reports that she noticed no changes with the muffled hearing and pressure sensation to the left ear.  She denies any drainage from the ear canal or any issues with dizziness.      Physical examination with the microscope revealed the patient to have bilateral middle ear effusions present.  The fluid appeared to be vipul color with no purulent drainage or any drainage to the canal.  Neck exam demonstrated no lymphadenopathy or thyromegaly.  Oral exam demonstrated no abnormalities to the posterior pharynx.  Patient was noted with dry mucosal membranes secondary to issues with food poisoning that occurred last night.  She was noted with no sinus tenderness to percussion bilaterally.      Impression: Persistent bilateral middle ear effusion    Plan: Treatment options were discussed with the patient and her .  I recommended another trial of prednisone at a higher dose as well as Afrin twice a day.  She is to follow-up in 2 to 3 weeks for reevaluation.  If the middle ear effusion remains the same, we will have her see Dr. Castillo for possible myringotomy.  Patient was reminded to call if she has any questions or concerns.      Electronically signed by YANELIS CHRISTOPHER PA-C on 6/10/24 at 11:41 AM [place]

## 2024-07-01 ENCOUNTER — OFFICE VISIT (OUTPATIENT)
Dept: ENT CLINIC | Age: 74
End: 2024-07-01
Payer: MEDICARE

## 2024-07-01 VITALS
WEIGHT: 148 LBS | HEIGHT: 67 IN | DIASTOLIC BLOOD PRESSURE: 76 MMHG | BODY MASS INDEX: 23.23 KG/M2 | SYSTOLIC BLOOD PRESSURE: 128 MMHG

## 2024-07-01 DIAGNOSIS — H65.93 MEE (MIDDLE EAR EFFUSION), BILATERAL: Primary | ICD-10-CM

## 2024-07-01 PROCEDURE — 1123F ACP DISCUSS/DSCN MKR DOCD: CPT | Performed by: PHYSICIAN ASSISTANT

## 2024-07-01 PROCEDURE — G8399 PT W/DXA RESULTS DOCUMENT: HCPCS | Performed by: PHYSICIAN ASSISTANT

## 2024-07-01 PROCEDURE — 1090F PRES/ABSN URINE INCON ASSESS: CPT | Performed by: PHYSICIAN ASSISTANT

## 2024-07-01 PROCEDURE — G8427 DOCREV CUR MEDS BY ELIG CLIN: HCPCS | Performed by: PHYSICIAN ASSISTANT

## 2024-07-01 PROCEDURE — 4004F PT TOBACCO SCREEN RCVD TLK: CPT | Performed by: PHYSICIAN ASSISTANT

## 2024-07-01 PROCEDURE — 3017F COLORECTAL CA SCREEN DOC REV: CPT | Performed by: PHYSICIAN ASSISTANT

## 2024-07-01 PROCEDURE — G8420 CALC BMI NORM PARAMETERS: HCPCS | Performed by: PHYSICIAN ASSISTANT

## 2024-07-01 PROCEDURE — 99213 OFFICE O/P EST LOW 20 MIN: CPT | Performed by: PHYSICIAN ASSISTANT

## 2024-07-01 RX ORDER — FLUTICASONE PROPIONATE 50 MCG
1 SPRAY, SUSPENSION (ML) NASAL DAILY
COMMUNITY

## 2024-07-01 NOTE — PROGRESS NOTES
Ms. Salazar is a pleasant 73-year-old  female who presents for a 2-week follow-up after treatment for left-sided middle ear effusion.  Patient reports that she has completed the medication and has noticed no changes.  She denies any drainage from the ears or any issues with fever or chills.      Physical examination with microscope demonstrated the patient to have bilateral middle ear effusions to be present.  Mnemonic exam demonstrated no remarkable change compared to last visit.  Neck exam demonstrated no lymphadenopathy or thyromegaly.  She was noted with no tenderness over the eustachian tube region.  Oral exam demonstrated no abnormalities to the posterior pharynx of the tongue surface.      Impression: Bilateral middle ear effusions-failed conservative management    Plan: Due to the patient failing routine treatment, I will have her to see Dr. Castillo for possible myringotomy versus placement of myringotomy tubes.  Patient is agreeable and wishes to proceed.  She was reminded to call if she has any questions or concerns.      Electronically signed by YANELIS CHRISTOPHER PA-C on 7/1/24 at 1:37 PM CDT

## 2024-07-02 ENCOUNTER — OFFICE VISIT (OUTPATIENT)
Dept: ENT CLINIC | Age: 74
End: 2024-07-02
Payer: MEDICARE

## 2024-07-02 VITALS — SYSTOLIC BLOOD PRESSURE: 124 MMHG | DIASTOLIC BLOOD PRESSURE: 78 MMHG

## 2024-07-02 DIAGNOSIS — H90.3 SENSORINEURAL HEARING LOSS (SNHL) OF BOTH EARS: Primary | ICD-10-CM

## 2024-07-02 PROCEDURE — 99213 OFFICE O/P EST LOW 20 MIN: CPT | Performed by: OTOLARYNGOLOGY

## 2024-07-02 PROCEDURE — G8427 DOCREV CUR MEDS BY ELIG CLIN: HCPCS | Performed by: OTOLARYNGOLOGY

## 2024-07-02 PROCEDURE — 3017F COLORECTAL CA SCREEN DOC REV: CPT | Performed by: OTOLARYNGOLOGY

## 2024-07-02 PROCEDURE — G8420 CALC BMI NORM PARAMETERS: HCPCS | Performed by: OTOLARYNGOLOGY

## 2024-07-02 PROCEDURE — 1123F ACP DISCUSS/DSCN MKR DOCD: CPT | Performed by: OTOLARYNGOLOGY

## 2024-07-02 PROCEDURE — G8399 PT W/DXA RESULTS DOCUMENT: HCPCS | Performed by: OTOLARYNGOLOGY

## 2024-07-02 PROCEDURE — 1090F PRES/ABSN URINE INCON ASSESS: CPT | Performed by: OTOLARYNGOLOGY

## 2024-07-02 PROCEDURE — 1036F TOBACCO NON-USER: CPT | Performed by: OTOLARYNGOLOGY

## 2024-07-02 ASSESSMENT — ENCOUNTER SYMPTOMS
GASTROINTESTINAL NEGATIVE: 1
EYES NEGATIVE: 1
RESPIRATORY NEGATIVE: 1
ALLERGIC/IMMUNOLOGIC NEGATIVE: 1

## 2024-07-02 NOTE — PROGRESS NOTES
Cervical back: Normal range of motion.   Skin:     General: Skin is warm and dry.   Neurological:      General: No focal deficit present.      Mental Status: She is alert and oriented to person, place, and time.   Psychiatric:         Mood and Affect: Mood normal.         Behavior: Behavior normal.              ASSESSMENT/PLAN:    1. Sensorineural hearing loss (SNHL) of both ears  No fluid in either ear.  She does seem to have some asymmetric hearing loss.  I will set up for hearing test to see was going on.  This is likely related to her stroke.    Return in about 4 weeks (around 7/30/2024).    An electronic signature was used to authenticate this note.    Yohan Castillo MD       Please note that this chart was generated using dragon dictation software.  Although every effort was made to ensure the accuracy of this automated transcription, some errors in transcription may have occurred.

## 2024-07-29 ENCOUNTER — OFFICE VISIT (OUTPATIENT)
Dept: ENT CLINIC | Age: 74
End: 2024-07-29
Payer: MEDICARE

## 2024-07-29 VITALS
DIASTOLIC BLOOD PRESSURE: 78 MMHG | HEIGHT: 67 IN | WEIGHT: 146 LBS | BODY MASS INDEX: 22.91 KG/M2 | SYSTOLIC BLOOD PRESSURE: 128 MMHG

## 2024-07-29 DIAGNOSIS — H91.91 PROFOUND HEARING LOSS OF RIGHT EAR: Primary | ICD-10-CM

## 2024-07-29 PROCEDURE — 99213 OFFICE O/P EST LOW 20 MIN: CPT | Performed by: OTOLARYNGOLOGY

## 2024-07-29 PROCEDURE — G8427 DOCREV CUR MEDS BY ELIG CLIN: HCPCS | Performed by: OTOLARYNGOLOGY

## 2024-07-29 PROCEDURE — 1036F TOBACCO NON-USER: CPT | Performed by: OTOLARYNGOLOGY

## 2024-07-29 PROCEDURE — G8399 PT W/DXA RESULTS DOCUMENT: HCPCS | Performed by: OTOLARYNGOLOGY

## 2024-07-29 PROCEDURE — 1090F PRES/ABSN URINE INCON ASSESS: CPT | Performed by: OTOLARYNGOLOGY

## 2024-07-29 PROCEDURE — 1123F ACP DISCUSS/DSCN MKR DOCD: CPT | Performed by: OTOLARYNGOLOGY

## 2024-07-29 PROCEDURE — G8420 CALC BMI NORM PARAMETERS: HCPCS | Performed by: OTOLARYNGOLOGY

## 2024-07-29 PROCEDURE — 3017F COLORECTAL CA SCREEN DOC REV: CPT | Performed by: OTOLARYNGOLOGY

## 2024-07-29 ASSESSMENT — ENCOUNTER SYMPTOMS
RESPIRATORY NEGATIVE: 1
ALLERGIC/IMMUNOLOGIC NEGATIVE: 1
GASTROINTESTINAL NEGATIVE: 1
EYES NEGATIVE: 1

## 2024-07-29 NOTE — PROGRESS NOTES
2024    Afsaneh Salazar (:  1950) is a 73 y.o. female, Established patient, here for evaluation of the following chief complaint(s):  Follow-up (Ears)      Vitals:    24 1127   BP: 128/78   Weight: 66.2 kg (146 lb)   Height: 1.689 m (5' 6.5\")       Wt Readings from Last 3 Encounters:   24 66.2 kg (146 lb)   24 67.1 kg (148 lb)   06/10/24 65.8 kg (145 lb)       BP Readings from Last 3 Encounters:   24 128/78   24 124/78   24 128/76         SUBJECTIVE/OBJECTIVE:    Patient seen today for her right ear.  I had a hearing test which shows profound hearing loss on the right.  They talked with her about cross hearing aids and I talked about it as well.  She is going to think about this.  No change in her symptoms.        Review of Systems   Constitutional: Negative.    HENT:  Positive for hearing loss.    Eyes: Negative.    Respiratory: Negative.     Cardiovascular: Negative.    Gastrointestinal: Negative.    Endocrine: Negative.    Musculoskeletal: Negative.    Skin: Negative.    Allergic/Immunologic: Negative.    Neurological: Negative.    Hematological: Negative.    Psychiatric/Behavioral: Negative.          Physical Exam  Vitals reviewed.   Constitutional:       Appearance: Normal appearance. She is normal weight.   HENT:      Head: Normocephalic and atraumatic.      Right Ear: Tympanic membrane, ear canal and external ear normal.      Left Ear: Tympanic membrane, ear canal and external ear normal.      Nose: Nose normal.      Mouth/Throat:      Mouth: Mucous membranes are moist.      Pharynx: Oropharynx is clear.   Eyes:      Extraocular Movements: Extraocular movements intact.      Pupils: Pupils are equal, round, and reactive to light.   Cardiovascular:      Rate and Rhythm: Normal rate and regular rhythm.   Pulmonary:      Effort: Pulmonary effort is normal.      Breath sounds: Normal breath sounds.   Musculoskeletal:      Cervical back: Normal range of motion.

## 2024-09-26 ENCOUNTER — OFFICE VISIT (OUTPATIENT)
Dept: CARDIOLOGY | Facility: CLINIC | Age: 74
End: 2024-09-26
Payer: MEDICARE

## 2024-09-26 VITALS
BODY MASS INDEX: 22.91 KG/M2 | WEIGHT: 146 LBS | DIASTOLIC BLOOD PRESSURE: 80 MMHG | HEIGHT: 67 IN | HEART RATE: 77 BPM | SYSTOLIC BLOOD PRESSURE: 124 MMHG | OXYGEN SATURATION: 98 %

## 2024-09-26 DIAGNOSIS — I48.0 PAROXYSMAL ATRIAL FIBRILLATION: Primary | ICD-10-CM

## 2024-09-26 DIAGNOSIS — E78.5 HYPERLIPIDEMIA LDL GOAL <70: ICD-10-CM

## 2024-09-26 PROCEDURE — 1160F RVW MEDS BY RX/DR IN RCRD: CPT | Performed by: INTERNAL MEDICINE

## 2024-09-26 PROCEDURE — 99213 OFFICE O/P EST LOW 20 MIN: CPT | Performed by: INTERNAL MEDICINE

## 2024-09-26 PROCEDURE — 93000 ELECTROCARDIOGRAM COMPLETE: CPT | Performed by: INTERNAL MEDICINE

## 2024-09-26 PROCEDURE — 1159F MED LIST DOCD IN RCRD: CPT | Performed by: INTERNAL MEDICINE

## 2024-09-26 RX ORDER — LOSARTAN POTASSIUM 100 MG/1
100 TABLET ORAL DAILY
COMMUNITY

## 2024-10-23 ENCOUNTER — TRANSCRIBE ORDERS (OUTPATIENT)
Dept: ADMINISTRATIVE | Facility: HOSPITAL | Age: 74
End: 2024-10-23
Payer: MEDICARE

## 2024-10-23 DIAGNOSIS — Z12.31 ENCOUNTER FOR SCREENING MAMMOGRAM FOR MALIGNANT NEOPLASM OF BREAST: Primary | ICD-10-CM

## 2024-10-29 ENCOUNTER — OFFICE VISIT (OUTPATIENT)
Dept: ENT CLINIC | Age: 74
End: 2024-10-29

## 2024-10-29 VITALS
HEIGHT: 67 IN | SYSTOLIC BLOOD PRESSURE: 130 MMHG | WEIGHT: 146 LBS | DIASTOLIC BLOOD PRESSURE: 82 MMHG | BODY MASS INDEX: 22.91 KG/M2

## 2024-10-29 DIAGNOSIS — H90.3 SENSORINEURAL HEARING LOSS (SNHL) OF BOTH EARS: Primary | ICD-10-CM

## 2024-10-29 NOTE — PROGRESS NOTES
10/29/2024    Afsaneh Salazar (:  1950) is a 73 y.o. female, Established patient, here for evaluation of the following chief complaint(s):  Follow-up (Ears)      Vitals:    10/29/24 1110   BP: 130/82   Weight: 66.2 kg (146 lb)   Height: 1.689 m (5' 6.5\")       Wt Readings from Last 3 Encounters:   10/29/24 66.2 kg (146 lb)   24 66.2 kg (146 lb)   24 67.1 kg (148 lb)       BP Readings from Last 3 Encounters:   10/29/24 130/82   24 128/78   24 124/78         SUBJECTIVE/OBJECTIVE:    Patient seen today for her ears.  She suffers from asymmetric hearing loss from a stroke but she is not interested in hearing aids.  Here today for a 3-month cerumen check.  She reports no issues.  She is doing well.        Review of Systems   Constitutional: Negative.    HENT:  Positive for hearing loss.    Eyes: Negative.    Respiratory: Negative.     Cardiovascular: Negative.    Gastrointestinal: Negative.    Endocrine: Negative.    Musculoskeletal: Negative.    Skin: Negative.    Allergic/Immunologic: Negative.    Neurological: Negative.    Hematological: Negative.    Psychiatric/Behavioral: Negative.          Physical Exam  Vitals reviewed.   Constitutional:       Appearance: Normal appearance. She is normal weight.   HENT:      Head: Normocephalic and atraumatic.      Right Ear: Tympanic membrane, ear canal and external ear normal.      Left Ear: Tympanic membrane, ear canal and external ear normal.      Nose: Nose normal.      Mouth/Throat:      Mouth: Mucous membranes are moist.      Pharynx: Oropharynx is clear.   Eyes:      Extraocular Movements: Extraocular movements intact.      Pupils: Pupils are equal, round, and reactive to light.   Cardiovascular:      Rate and Rhythm: Normal rate and regular rhythm.   Pulmonary:      Effort: Pulmonary effort is normal.      Breath sounds: Normal breath sounds.   Musculoskeletal:      Cervical back: Normal range of motion.   Skin:     General: Skin is

## 2024-11-11 LAB
NCCN CRITERIA FLAG: ABNORMAL
TYRER CUZICK SCORE: 6.7

## 2024-11-11 NOTE — PROGRESS NOTES
This patient recently took the CARE risk assessment for a mammogram appointment. Based on the patient's responses, NCCN criteria for genetic testing was met.     Navigator follow-up:     The patient previously declined genetic testing.  I sent a [employer] message with my contact information in case she has reconsidered.

## 2024-11-21 ENCOUNTER — HOSPITAL ENCOUNTER (OUTPATIENT)
Dept: MAMMOGRAPHY | Facility: HOSPITAL | Age: 74
Discharge: HOME | End: 2024-11-21
Admitting: FAMILY MEDICINE
Payer: MEDICARE

## 2024-11-21 DIAGNOSIS — Z12.31 ENCOUNTER FOR SCREENING MAMMOGRAM FOR MALIGNANT NEOPLASM OF BREAST: ICD-10-CM

## 2024-11-21 PROCEDURE — 77063 BREAST TOMOSYNTHESIS BI: CPT

## 2024-11-21 PROCEDURE — 77067 SCR MAMMO BI INCL CAD: CPT

## 2025-04-29 ENCOUNTER — OFFICE VISIT (OUTPATIENT)
Dept: ENT CLINIC | Age: 75
End: 2025-04-29
Payer: MEDICARE

## 2025-04-29 VITALS
BODY MASS INDEX: 22.91 KG/M2 | DIASTOLIC BLOOD PRESSURE: 74 MMHG | SYSTOLIC BLOOD PRESSURE: 128 MMHG | HEIGHT: 67 IN | WEIGHT: 146 LBS

## 2025-04-29 DIAGNOSIS — H90.3 SENSORINEURAL HEARING LOSS (SNHL) OF BOTH EARS: Primary | ICD-10-CM

## 2025-04-29 DIAGNOSIS — H61.21 IMPACTED CERUMEN OF RIGHT EAR: ICD-10-CM

## 2025-04-29 PROCEDURE — 1090F PRES/ABSN URINE INCON ASSESS: CPT | Performed by: OTOLARYNGOLOGY

## 2025-04-29 PROCEDURE — 99213 OFFICE O/P EST LOW 20 MIN: CPT | Performed by: OTOLARYNGOLOGY

## 2025-04-29 PROCEDURE — G8420 CALC BMI NORM PARAMETERS: HCPCS | Performed by: OTOLARYNGOLOGY

## 2025-04-29 PROCEDURE — G8427 DOCREV CUR MEDS BY ELIG CLIN: HCPCS | Performed by: OTOLARYNGOLOGY

## 2025-04-29 PROCEDURE — G8399 PT W/DXA RESULTS DOCUMENT: HCPCS | Performed by: OTOLARYNGOLOGY

## 2025-04-29 PROCEDURE — 1160F RVW MEDS BY RX/DR IN RCRD: CPT | Performed by: OTOLARYNGOLOGY

## 2025-04-29 PROCEDURE — 3017F COLORECTAL CA SCREEN DOC REV: CPT | Performed by: OTOLARYNGOLOGY

## 2025-04-29 PROCEDURE — 69210 REMOVE IMPACTED EAR WAX UNI: CPT | Performed by: OTOLARYNGOLOGY

## 2025-04-29 PROCEDURE — 1123F ACP DISCUSS/DSCN MKR DOCD: CPT | Performed by: OTOLARYNGOLOGY

## 2025-04-29 PROCEDURE — 1036F TOBACCO NON-USER: CPT | Performed by: OTOLARYNGOLOGY

## 2025-04-29 PROCEDURE — 1159F MED LIST DOCD IN RCRD: CPT | Performed by: OTOLARYNGOLOGY

## 2025-04-29 RX ORDER — APIXABAN 5 MG/1
5 TABLET, FILM COATED ORAL 2 TIMES DAILY
COMMUNITY

## 2025-04-29 RX ORDER — ROSUVASTATIN CALCIUM 20 MG/1
10 TABLET, COATED ORAL DAILY
COMMUNITY

## 2025-04-29 RX ORDER — METOPROLOL TARTRATE 25 MG/1
TABLET, FILM COATED ORAL
COMMUNITY

## 2025-04-29 NOTE — PROGRESS NOTES
2025    Afsaneh Salazar (:  1950) is a 74 y.o. female, Established patient, here for evaluation of the following chief complaint(s):  Follow-up (Ears)      Vitals:    25 1056   BP: 128/74   Weight: 66.2 kg (146 lb)   Height: 1.689 m (5' 6.5\")       Wt Readings from Last 3 Encounters:   25 66.2 kg (146 lb)   10/29/24 66.2 kg (146 lb)   24 66.2 kg (146 lb)       BP Readings from Last 3 Encounters:   25 128/74   10/29/24 130/82   24 128/78         SUBJECTIVE/OBJECTIVE:    Patient seen today for her ears.  I seen her in the past for cerumen impaction on the right.  Her primary care told her recently she had cerumen on the right and fluid on the left.  She says that her sinuses feel somewhat numb with the allergies.        Review of Systems   HENT:  Positive for hearing loss.         Physical Exam  Vitals reviewed.   Constitutional:       Appearance: Normal appearance. She is normal weight.   HENT:      Head: Normocephalic and atraumatic.      Right Ear: Tympanic membrane, ear canal and external ear normal. There is impacted cerumen.      Left Ear: Tympanic membrane, ear canal and external ear normal.      Nose: Nose normal.      Mouth/Throat:      Mouth: Mucous membranes are moist.      Pharynx: Oropharynx is clear.   Eyes:      Extraocular Movements: Extraocular movements intact.      Pupils: Pupils are equal, round, and reactive to light.   Cardiovascular:      Rate and Rhythm: Normal rate and regular rhythm.   Pulmonary:      Effort: Pulmonary effort is normal.      Breath sounds: Normal breath sounds.   Musculoskeletal:      Cervical back: Normal range of motion.   Skin:     General: Skin is warm and dry.   Neurological:      General: No focal deficit present.      Mental Status: She is alert and oriented to person, place, and time.   Psychiatric:         Mood and Affect: Mood normal.         Behavior: Behavior normal.        Procedure Note:    Use of Operating

## 2025-07-29 ENCOUNTER — OFFICE VISIT (OUTPATIENT)
Dept: ENT CLINIC | Age: 75
End: 2025-07-29
Payer: MEDICARE

## 2025-07-29 VITALS — SYSTOLIC BLOOD PRESSURE: 116 MMHG | DIASTOLIC BLOOD PRESSURE: 72 MMHG

## 2025-07-29 DIAGNOSIS — H90.3 SENSORINEURAL HEARING LOSS (SNHL) OF BOTH EARS: Primary | ICD-10-CM

## 2025-07-29 PROCEDURE — 99213 OFFICE O/P EST LOW 20 MIN: CPT | Performed by: OTOLARYNGOLOGY

## 2025-07-29 PROCEDURE — G8420 CALC BMI NORM PARAMETERS: HCPCS | Performed by: OTOLARYNGOLOGY

## 2025-07-29 PROCEDURE — 1036F TOBACCO NON-USER: CPT | Performed by: OTOLARYNGOLOGY

## 2025-07-29 PROCEDURE — 1160F RVW MEDS BY RX/DR IN RCRD: CPT | Performed by: OTOLARYNGOLOGY

## 2025-07-29 PROCEDURE — 1159F MED LIST DOCD IN RCRD: CPT | Performed by: OTOLARYNGOLOGY

## 2025-07-29 PROCEDURE — G8399 PT W/DXA RESULTS DOCUMENT: HCPCS | Performed by: OTOLARYNGOLOGY

## 2025-07-29 PROCEDURE — 1123F ACP DISCUSS/DSCN MKR DOCD: CPT | Performed by: OTOLARYNGOLOGY

## 2025-07-29 PROCEDURE — 3017F COLORECTAL CA SCREEN DOC REV: CPT | Performed by: OTOLARYNGOLOGY

## 2025-07-29 PROCEDURE — 1090F PRES/ABSN URINE INCON ASSESS: CPT | Performed by: OTOLARYNGOLOGY

## 2025-07-29 PROCEDURE — G8427 DOCREV CUR MEDS BY ELIG CLIN: HCPCS | Performed by: OTOLARYNGOLOGY

## 2025-07-29 ASSESSMENT — ENCOUNTER SYMPTOMS
RESPIRATORY NEGATIVE: 1
GASTROINTESTINAL NEGATIVE: 1
EYES NEGATIVE: 1
ALLERGIC/IMMUNOLOGIC NEGATIVE: 1

## 2025-07-29 NOTE — PROGRESS NOTES
2025    Afsaneh Salazar (:  1950) is a 74 y.o. female, Established patient, here for evaluation of the following chief complaint(s):  Follow-up (Ears)      Vitals:    25 1030   BP: 116/72       Wt Readings from Last 3 Encounters:   25 66.2 kg (146 lb)   10/29/24 66.2 kg (146 lb)   24 66.2 kg (146 lb)       BP Readings from Last 3 Encounters:   25 116/72   25 128/74   10/29/24 130/82         SUBJECTIVE/OBJECTIVE:    This sweet woman is seen with her  for her ears.  I seen her in the past for cerumen impaction.  She reports ear is doing fairly well but she said when her  blow dries her ears the left side she feels pressure with the ear plug and.  Overall she is doing okay.        Review of Systems   Constitutional: Negative.    HENT:  Positive for hearing loss.    Eyes: Negative.    Respiratory: Negative.     Cardiovascular: Negative.    Gastrointestinal: Negative.    Endocrine: Negative.    Musculoskeletal: Negative.    Skin: Negative.    Allergic/Immunologic: Negative.    Neurological: Negative.    Hematological: Negative.    Psychiatric/Behavioral: Negative.          Physical Exam  Vitals reviewed.   Constitutional:       Appearance: Normal appearance. She is normal weight.   HENT:      Head: Normocephalic and atraumatic.      Right Ear: Tympanic membrane, ear canal and external ear normal.      Left Ear: Tympanic membrane, ear canal and external ear normal.      Nose: Nose normal.      Mouth/Throat:      Mouth: Mucous membranes are moist.      Pharynx: Oropharynx is clear.   Eyes:      Extraocular Movements: Extraocular movements intact.      Pupils: Pupils are equal, round, and reactive to light.   Cardiovascular:      Rate and Rhythm: Normal rate and regular rhythm.   Pulmonary:      Effort: Pulmonary effort is normal.      Breath sounds: Normal breath sounds.   Musculoskeletal:      Cervical back: Normal range of motion.   Skin:     General: Skin is